# Patient Record
Sex: FEMALE | Race: BLACK OR AFRICAN AMERICAN | NOT HISPANIC OR LATINO | Employment: FULL TIME | URBAN - METROPOLITAN AREA
[De-identification: names, ages, dates, MRNs, and addresses within clinical notes are randomized per-mention and may not be internally consistent; named-entity substitution may affect disease eponyms.]

---

## 2017-11-06 ENCOUNTER — GENERIC CONVERSION - ENCOUNTER (OUTPATIENT)
Dept: OTHER | Facility: OTHER | Age: 47
End: 2017-11-06

## 2017-11-06 DIAGNOSIS — N92.0 EXCESSIVE AND FREQUENT MENSTRUATION WITH REGULAR CYCLE: ICD-10-CM

## 2017-11-08 ENCOUNTER — GENERIC CONVERSION - ENCOUNTER (OUTPATIENT)
Dept: OTHER | Facility: OTHER | Age: 47
End: 2017-11-08

## 2017-11-16 ENCOUNTER — TRANSCRIBE ORDERS (OUTPATIENT)
Dept: LAB | Facility: CLINIC | Age: 47
End: 2017-11-16

## 2017-11-16 ENCOUNTER — APPOINTMENT (OUTPATIENT)
Dept: LAB | Facility: CLINIC | Age: 47
End: 2017-11-16
Payer: COMMERCIAL

## 2017-11-16 DIAGNOSIS — N93.9 VAGINAL HEMORRHAGE: Primary | ICD-10-CM

## 2017-11-16 DIAGNOSIS — N93.9 VAGINAL HEMORRHAGE: ICD-10-CM

## 2017-11-16 LAB
ABO GROUP BLD: NORMAL
BLD GP AB SCN SERPL QL: NEGATIVE
RH BLD: POSITIVE
SPECIMEN EXPIRATION DATE: NORMAL

## 2017-11-16 PROCEDURE — 86900 BLOOD TYPING SEROLOGIC ABO: CPT

## 2017-11-16 PROCEDURE — 86901 BLOOD TYPING SEROLOGIC RH(D): CPT

## 2017-11-16 PROCEDURE — 36415 COLL VENOUS BLD VENIPUNCTURE: CPT

## 2017-11-16 PROCEDURE — 86920 COMPATIBILITY TEST SPIN: CPT

## 2017-11-16 PROCEDURE — 86850 RBC ANTIBODY SCREEN: CPT

## 2017-11-16 RX ORDER — ACETAMINOPHEN 325 MG/1
650 TABLET ORAL ONCE
Status: COMPLETED | OUTPATIENT
Start: 2017-11-18 | End: 2017-11-18

## 2017-11-16 RX ORDER — SODIUM CHLORIDE 9 MG/ML
20 INJECTION, SOLUTION INTRAVENOUS CONTINUOUS
Status: DISCONTINUED | OUTPATIENT
Start: 2017-11-18 | End: 2017-11-21 | Stop reason: HOSPADM

## 2017-11-18 ENCOUNTER — HOSPITAL ENCOUNTER (OUTPATIENT)
Dept: INFUSION CENTER | Facility: CLINIC | Age: 47
Discharge: HOME | End: 2017-11-18
Payer: COMMERCIAL

## 2017-11-18 VITALS
HEART RATE: 72 BPM | SYSTOLIC BLOOD PRESSURE: 130 MMHG | TEMPERATURE: 98 F | RESPIRATION RATE: 18 BRPM | DIASTOLIC BLOOD PRESSURE: 72 MMHG

## 2017-11-18 PROCEDURE — 36430 TRANSFUSION BLD/BLD COMPNT: CPT

## 2017-11-18 PROCEDURE — P9040 RBC LEUKOREDUCED IRRADIATED: HCPCS

## 2017-11-18 RX ORDER — COVID-19 ANTIGEN TEST
1 KIT MISCELLANEOUS AS NEEDED
COMMUNITY
End: 2018-01-11 | Stop reason: HOSPADM

## 2017-11-18 RX ADMIN — ACETAMINOPHEN 650 MG: 325 TABLET ORAL at 09:07

## 2017-11-18 RX ADMIN — SODIUM CHLORIDE 20 ML/HR: 0.9 INJECTION, SOLUTION INTRAVENOUS at 08:55

## 2017-11-18 RX ADMIN — DIPHENHYDRAMINE HYDROCHLORIDE 25 MG: 50 INJECTION, SOLUTION INTRAMUSCULAR; INTRAVENOUS at 09:12

## 2017-11-18 NOTE — PROGRESS NOTES
Pt has no c/o -- Pt states shes been feeling weak, and tired  Her labs jovanna done in [place] where she works  Pt states her Hgb was at 6 6 when last checked       Pt given review of blood transfusion indications, and what s/sx to report

## 2017-11-18 NOTE — PROGRESS NOTES
Pt dcd stable and ambulatory -- has AVS      verb understanding when to call / if need to call MD post transfusion instructions

## 2017-11-19 LAB
ABO GROUP BLD BPU: NORMAL
ABO GROUP BLD BPU: NORMAL
BPU ID: NORMAL
BPU ID: NORMAL
CROSSMATCH: NORMAL
CROSSMATCH: NORMAL
UNIT DISPENSE STATUS: NORMAL
UNIT DISPENSE STATUS: NORMAL
UNIT PRODUCT CODE: NORMAL
UNIT PRODUCT CODE: NORMAL
UNIT RH: NORMAL
UNIT RH: NORMAL

## 2017-11-22 ENCOUNTER — GENERIC CONVERSION - ENCOUNTER (OUTPATIENT)
Dept: OTHER | Facility: OTHER | Age: 47
End: 2017-11-22

## 2017-12-06 ENCOUNTER — GENERIC CONVERSION - ENCOUNTER (OUTPATIENT)
Dept: OTHER | Facility: OTHER | Age: 47
End: 2017-12-06

## 2017-12-20 ENCOUNTER — GENERIC CONVERSION - ENCOUNTER (OUTPATIENT)
Dept: OTHER | Facility: OTHER | Age: 47
End: 2017-12-20

## 2017-12-20 DIAGNOSIS — N93.9 ABNORMAL UTERINE AND VAGINAL BLEEDING, UNSPECIFIED (CODE): ICD-10-CM

## 2017-12-20 PROCEDURE — 88305 TISSUE EXAM BY PATHOLOGIST: CPT | Performed by: OBSTETRICS & GYNECOLOGY

## 2017-12-20 PROCEDURE — 88341 IMHCHEM/IMCYTCHM EA ADD ANTB: CPT | Performed by: OBSTETRICS & GYNECOLOGY

## 2017-12-20 PROCEDURE — 87624 HPV HI-RISK TYP POOLED RSLT: CPT | Performed by: OBSTETRICS & GYNECOLOGY

## 2017-12-20 PROCEDURE — G0143 SCR C/V CYTO,THINLAYER,RESCR: HCPCS | Performed by: OBSTETRICS & GYNECOLOGY

## 2017-12-20 PROCEDURE — 88342 IMHCHEM/IMCYTCHM 1ST ANTB: CPT | Performed by: OBSTETRICS & GYNECOLOGY

## 2017-12-21 ENCOUNTER — LAB REQUISITION (OUTPATIENT)
Dept: LAB | Facility: HOSPITAL | Age: 47
End: 2017-12-21
Payer: COMMERCIAL

## 2017-12-21 DIAGNOSIS — N93.9 ABNORMAL UTERINE AND VAGINAL BLEEDING, UNSPECIFIED (CODE): ICD-10-CM

## 2017-12-21 RX ORDER — FERROUS SULFATE 325(65) MG
1 TABLET ORAL 2 TIMES DAILY
COMMUNITY
Start: 2017-11-13 | End: 2018-03-02 | Stop reason: ALTCHOICE

## 2017-12-21 NOTE — PRE-PROCEDURE INSTRUCTIONS
Pre-Surgery Instructions:   Medication Instructions    ferrous sulfate 325 (65 Fe) mg tablet Instructed patient per Anesthesia Guidelines   Naproxen Sodium (ALEVE) 220 MG CAPS Instructed patient per Anesthesia Guidelines  Pre-op and bathing instructions given

## 2017-12-22 ENCOUNTER — LAB REQUISITION (OUTPATIENT)
Dept: LAB | Facility: HOSPITAL | Age: 47
End: 2017-12-22
Payer: COMMERCIAL

## 2017-12-22 DIAGNOSIS — N93.9 ABNORMAL UTERINE AND VAGINAL BLEEDING, UNSPECIFIED (CODE): ICD-10-CM

## 2017-12-23 ENCOUNTER — APPOINTMENT (OUTPATIENT)
Dept: LAB | Facility: CLINIC | Age: 47
End: 2017-12-23
Payer: COMMERCIAL

## 2017-12-23 ENCOUNTER — TRANSCRIBE ORDERS (OUTPATIENT)
Dept: LAB | Facility: CLINIC | Age: 47
End: 2017-12-23

## 2017-12-23 DIAGNOSIS — N93.9 VAGINAL HEMORRHAGE: Primary | ICD-10-CM

## 2017-12-23 DIAGNOSIS — N93.9 VAGINAL HEMORRHAGE: ICD-10-CM

## 2017-12-23 LAB
BASOPHILS # BLD AUTO: 0.03 THOUSANDS/ΜL (ref 0–0.1)
BASOPHILS NFR BLD AUTO: 1 % (ref 0–1)
EOSINOPHIL # BLD AUTO: 0.15 THOUSAND/ΜL (ref 0–0.61)
EOSINOPHIL NFR BLD AUTO: 2 % (ref 0–6)
EST. AVERAGE GLUCOSE BLD GHB EST-MCNC: 108 MG/DL
HBA1C MFR BLD: 5.4 % (ref 4.2–6.3)
HCT VFR BLD AUTO: 34.8 % (ref 34.8–46.1)
HGB BLD-MCNC: 10.7 G/DL (ref 11.5–15.4)
LYMPHOCYTES # BLD AUTO: 1.77 THOUSANDS/ΜL (ref 0.6–4.47)
LYMPHOCYTES NFR BLD AUTO: 28 % (ref 14–44)
MCH RBC QN AUTO: 23.4 PG (ref 26.8–34.3)
MCHC RBC AUTO-ENTMCNC: 30.7 G/DL (ref 31.4–37.4)
MCV RBC AUTO: 76 FL (ref 82–98)
MONOCYTES # BLD AUTO: 0.64 THOUSAND/ΜL (ref 0.17–1.22)
MONOCYTES NFR BLD AUTO: 10 % (ref 4–12)
NEUTROPHILS # BLD AUTO: 3.74 THOUSANDS/ΜL (ref 1.85–7.62)
NEUTS SEG NFR BLD AUTO: 59 % (ref 43–75)
PLATELET # BLD AUTO: 312 THOUSANDS/UL (ref 149–390)
PMV BLD AUTO: 8.6 FL (ref 8.9–12.7)
RBC # BLD AUTO: 4.57 MILLION/UL (ref 3.81–5.12)
WBC # BLD AUTO: 6.33 THOUSAND/UL (ref 4.31–10.16)

## 2017-12-23 PROCEDURE — 36415 COLL VENOUS BLD VENIPUNCTURE: CPT

## 2017-12-23 PROCEDURE — 83036 HEMOGLOBIN GLYCOSYLATED A1C: CPT

## 2017-12-23 PROCEDURE — 85025 COMPLETE CBC W/AUTO DIFF WBC: CPT

## 2017-12-26 LAB — HPV RRNA GENITAL QL NAA+PROBE: NORMAL

## 2017-12-28 ENCOUNTER — GENERIC CONVERSION - ENCOUNTER (OUTPATIENT)
Dept: OTHER | Facility: OTHER | Age: 47
End: 2017-12-28

## 2018-01-02 LAB
LAB AP GYN PRIMARY INTERPRETATION: NORMAL
Lab: NORMAL

## 2018-01-03 ENCOUNTER — GENERIC CONVERSION - ENCOUNTER (OUTPATIENT)
Dept: OTHER | Facility: OTHER | Age: 48
End: 2018-01-03

## 2018-01-06 ENCOUNTER — ANESTHESIA EVENT (OUTPATIENT)
Dept: PERIOP | Facility: HOSPITAL | Age: 48
DRG: 743 | End: 2018-01-06
Payer: COMMERCIAL

## 2018-01-08 ENCOUNTER — APPOINTMENT (OUTPATIENT)
Dept: RADIOLOGY | Facility: HOSPITAL | Age: 48
DRG: 743 | End: 2018-01-08
Payer: COMMERCIAL

## 2018-01-08 ENCOUNTER — ANESTHESIA (OUTPATIENT)
Dept: PERIOP | Facility: HOSPITAL | Age: 48
DRG: 743 | End: 2018-01-08
Payer: COMMERCIAL

## 2018-01-08 ENCOUNTER — HOSPITAL ENCOUNTER (INPATIENT)
Facility: HOSPITAL | Age: 48
LOS: 3 days | Discharge: HOME | DRG: 743 | End: 2018-01-11
Attending: OBSTETRICS & GYNECOLOGY | Admitting: OBSTETRICS & GYNECOLOGY
Payer: COMMERCIAL

## 2018-01-08 DIAGNOSIS — N93.9 ABNORMAL UTERINE AND VAGINAL BLEEDING, UNSPECIFIED (CODE): ICD-10-CM

## 2018-01-08 PROBLEM — D21.9 FIBROID: Status: ACTIVE | Noted: 2018-01-08

## 2018-01-08 PROBLEM — Z90.710 S/P ABDOMINAL HYSTERECTOMY: Status: ACTIVE | Noted: 2018-01-08

## 2018-01-08 LAB
ABO GROUP BLD: NORMAL
BASOPHILS # BLD AUTO: 0.03 THOUSANDS/ΜL (ref 0–0.1)
BASOPHILS NFR BLD AUTO: 1 % (ref 0–1)
BLD GP AB SCN SERPL QL: NEGATIVE
EOSINOPHIL # BLD AUTO: 0.13 THOUSAND/ΜL (ref 0–0.61)
EOSINOPHIL NFR BLD AUTO: 3 % (ref 0–6)
ERYTHROCYTE [DISTWIDTH] IN BLOOD BY AUTOMATED COUNT: 25.6 % (ref 11.6–15.1)
ERYTHROCYTE [DISTWIDTH] IN BLOOD BY AUTOMATED COUNT: 25.6 % (ref 11.6–15.1)
EXT PREGNANCY TEST URINE: NEGATIVE
HCT VFR BLD AUTO: 30.7 % (ref 34.8–46.1)
HCT VFR BLD AUTO: 34.9 % (ref 34.8–46.1)
HGB BLD-MCNC: 10.7 G/DL (ref 11.5–15.4)
HGB BLD-MCNC: 9.2 G/DL (ref 11.5–15.4)
LYMPHOCYTES # BLD AUTO: 1.4 THOUSANDS/ΜL (ref 0.6–4.47)
LYMPHOCYTES NFR BLD AUTO: 28 % (ref 14–44)
MCH RBC QN AUTO: 24.3 PG (ref 26.8–34.3)
MCH RBC QN AUTO: 24.4 PG (ref 26.8–34.3)
MCHC RBC AUTO-ENTMCNC: 30 G/DL (ref 31.4–37.4)
MCHC RBC AUTO-ENTMCNC: 30.7 G/DL (ref 31.4–37.4)
MCV RBC AUTO: 80 FL (ref 82–98)
MCV RBC AUTO: 81 FL (ref 82–98)
MONOCYTES # BLD AUTO: 0.49 THOUSAND/ΜL (ref 0.17–1.22)
MONOCYTES NFR BLD AUTO: 10 % (ref 4–12)
NEUTROPHILS # BLD AUTO: 2.92 THOUSANDS/ΜL (ref 1.85–7.62)
NEUTS SEG NFR BLD AUTO: 58 % (ref 43–75)
PLATELET # BLD AUTO: 372 THOUSANDS/UL (ref 149–390)
PLATELET # BLD AUTO: 380 THOUSANDS/UL (ref 149–390)
PMV BLD AUTO: 9 FL (ref 8.9–12.7)
PMV BLD AUTO: 9.8 FL (ref 8.9–12.7)
RBC # BLD AUTO: 3.79 MILLION/UL (ref 3.81–5.12)
RBC # BLD AUTO: 4.39 MILLION/UL (ref 3.81–5.12)
RH BLD: POSITIVE
SPECIMEN EXPIRATION DATE: NORMAL
WBC # BLD AUTO: 15.19 THOUSAND/UL (ref 4.31–10.16)
WBC # BLD AUTO: 4.97 THOUSAND/UL (ref 4.31–10.16)

## 2018-01-08 PROCEDURE — 86901 BLOOD TYPING SEROLOGIC RH(D): CPT | Performed by: OBSTETRICS & GYNECOLOGY

## 2018-01-08 PROCEDURE — 0UJD4ZZ INSPECTION OF UTERUS AND CERVIX, PERCUTANEOUS ENDOSCOPIC APPROACH: ICD-10-PCS | Performed by: OBSTETRICS & GYNECOLOGY

## 2018-01-08 PROCEDURE — 85025 COMPLETE CBC W/AUTO DIFF WBC: CPT | Performed by: OBSTETRICS & GYNECOLOGY

## 2018-01-08 PROCEDURE — 88307 TISSUE EXAM BY PATHOLOGIST: CPT | Performed by: OBSTETRICS & GYNECOLOGY

## 2018-01-08 PROCEDURE — 0TJB8ZZ INSPECTION OF BLADDER, VIA NATURAL OR ARTIFICIAL OPENING ENDOSCOPIC: ICD-10-PCS | Performed by: OBSTETRICS & GYNECOLOGY

## 2018-01-08 PROCEDURE — 85027 COMPLETE CBC AUTOMATED: CPT | Performed by: OBSTETRICS & GYNECOLOGY

## 2018-01-08 PROCEDURE — 86923 COMPATIBILITY TEST ELECTRIC: CPT

## 2018-01-08 PROCEDURE — 0UT90ZZ RESECTION OF UTERUS, OPEN APPROACH: ICD-10-PCS | Performed by: OBSTETRICS & GYNECOLOGY

## 2018-01-08 PROCEDURE — 81025 URINE PREGNANCY TEST: CPT | Performed by: OBSTETRICS & GYNECOLOGY

## 2018-01-08 PROCEDURE — 0UT70ZZ RESECTION OF BILATERAL FALLOPIAN TUBES, OPEN APPROACH: ICD-10-PCS | Performed by: OBSTETRICS & GYNECOLOGY

## 2018-01-08 PROCEDURE — 86850 RBC ANTIBODY SCREEN: CPT | Performed by: OBSTETRICS & GYNECOLOGY

## 2018-01-08 PROCEDURE — 82948 REAGENT STRIP/BLOOD GLUCOSE: CPT

## 2018-01-08 PROCEDURE — 94762 N-INVAS EAR/PLS OXIMTRY CONT: CPT

## 2018-01-08 PROCEDURE — 86900 BLOOD TYPING SEROLOGIC ABO: CPT | Performed by: OBSTETRICS & GYNECOLOGY

## 2018-01-08 RX ORDER — PHENAZOPYRIDINE HYDROCHLORIDE 100 MG/1
100 TABLET, FILM COATED ORAL ONCE
Status: COMPLETED | OUTPATIENT
Start: 2018-01-08 | End: 2018-01-08

## 2018-01-08 RX ORDER — LIDOCAINE HYDROCHLORIDE 10 MG/ML
INJECTION, SOLUTION INFILTRATION; PERINEURAL AS NEEDED
Status: DISCONTINUED | OUTPATIENT
Start: 2018-01-08 | End: 2018-01-08 | Stop reason: SURG

## 2018-01-08 RX ORDER — ONDANSETRON 2 MG/ML
4 INJECTION INTRAMUSCULAR; INTRAVENOUS ONCE AS NEEDED
Status: DISCONTINUED | OUTPATIENT
Start: 2018-01-08 | End: 2018-01-08 | Stop reason: HOSPADM

## 2018-01-08 RX ORDER — ONDANSETRON 2 MG/ML
4 INJECTION INTRAMUSCULAR; INTRAVENOUS EVERY 6 HOURS PRN
Status: DISCONTINUED | OUTPATIENT
Start: 2018-01-08 | End: 2018-01-11 | Stop reason: HOSPADM

## 2018-01-08 RX ORDER — MAGNESIUM HYDROXIDE 1200 MG/15ML
LIQUID ORAL AS NEEDED
Status: DISCONTINUED | OUTPATIENT
Start: 2018-01-08 | End: 2018-01-08 | Stop reason: HOSPADM

## 2018-01-08 RX ORDER — SODIUM CHLORIDE 9 MG/ML
INJECTION, SOLUTION INTRAVENOUS CONTINUOUS PRN
Status: DISCONTINUED | OUTPATIENT
Start: 2018-01-08 | End: 2018-01-08 | Stop reason: SURG

## 2018-01-08 RX ORDER — DIPHENHYDRAMINE HYDROCHLORIDE 50 MG/ML
12.5 INJECTION INTRAMUSCULAR; INTRAVENOUS ONCE AS NEEDED
Status: DISCONTINUED | OUTPATIENT
Start: 2018-01-08 | End: 2018-01-08 | Stop reason: HOSPADM

## 2018-01-08 RX ORDER — SODIUM CHLORIDE, SODIUM LACTATE, POTASSIUM CHLORIDE, CALCIUM CHLORIDE 600; 310; 30; 20 MG/100ML; MG/100ML; MG/100ML; MG/100ML
125 INJECTION, SOLUTION INTRAVENOUS CONTINUOUS
Status: DISCONTINUED | OUTPATIENT
Start: 2018-01-08 | End: 2018-01-08

## 2018-01-08 RX ORDER — PREGABALIN 75 MG/1
150 CAPSULE ORAL ONCE
Status: COMPLETED | OUTPATIENT
Start: 2018-01-08 | End: 2018-01-08

## 2018-01-08 RX ORDER — ONDANSETRON 2 MG/ML
INJECTION INTRAMUSCULAR; INTRAVENOUS AS NEEDED
Status: DISCONTINUED | OUTPATIENT
Start: 2018-01-08 | End: 2018-01-08 | Stop reason: SURG

## 2018-01-08 RX ORDER — ALBUMIN, HUMAN INJ 5% 5 %
SOLUTION INTRAVENOUS CONTINUOUS PRN
Status: DISCONTINUED | OUTPATIENT
Start: 2018-01-08 | End: 2018-01-08 | Stop reason: SURG

## 2018-01-08 RX ORDER — DOCUSATE SODIUM 100 MG/1
100 CAPSULE, LIQUID FILLED ORAL 2 TIMES DAILY
Status: DISCONTINUED | OUTPATIENT
Start: 2018-01-08 | End: 2018-01-11 | Stop reason: HOSPADM

## 2018-01-08 RX ORDER — SENNOSIDES 8.6 MG
1 TABLET ORAL DAILY
Status: DISCONTINUED | OUTPATIENT
Start: 2018-01-09 | End: 2018-01-11 | Stop reason: HOSPADM

## 2018-01-08 RX ORDER — GLYCOPYRROLATE 0.2 MG/ML
INJECTION INTRAMUSCULAR; INTRAVENOUS AS NEEDED
Status: DISCONTINUED | OUTPATIENT
Start: 2018-01-08 | End: 2018-01-08 | Stop reason: SURG

## 2018-01-08 RX ORDER — BUPIVACAINE HYDROCHLORIDE AND EPINEPHRINE 2.5; 5 MG/ML; UG/ML
INJECTION, SOLUTION INFILTRATION; PERINEURAL AS NEEDED
Status: DISCONTINUED | OUTPATIENT
Start: 2018-01-08 | End: 2018-01-08 | Stop reason: HOSPADM

## 2018-01-08 RX ORDER — FENTANYL CITRATE 50 UG/ML
INJECTION, SOLUTION INTRAMUSCULAR; INTRAVENOUS AS NEEDED
Status: DISCONTINUED | OUTPATIENT
Start: 2018-01-08 | End: 2018-01-08 | Stop reason: SURG

## 2018-01-08 RX ORDER — ACETAMINOPHEN 325 MG/1
975 TABLET ORAL ONCE
Status: COMPLETED | OUTPATIENT
Start: 2018-01-08 | End: 2018-01-08

## 2018-01-08 RX ORDER — SODIUM CHLORIDE 9 MG/ML
125 INJECTION, SOLUTION INTRAVENOUS CONTINUOUS
Status: DISCONTINUED | OUTPATIENT
Start: 2018-01-08 | End: 2018-01-09

## 2018-01-08 RX ORDER — MIDAZOLAM HYDROCHLORIDE 1 MG/ML
INJECTION INTRAMUSCULAR; INTRAVENOUS AS NEEDED
Status: DISCONTINUED | OUTPATIENT
Start: 2018-01-08 | End: 2018-01-08 | Stop reason: SURG

## 2018-01-08 RX ORDER — KETOROLAC TROMETHAMINE 30 MG/ML
INJECTION, SOLUTION INTRAMUSCULAR; INTRAVENOUS AS NEEDED
Status: DISCONTINUED | OUTPATIENT
Start: 2018-01-08 | End: 2018-01-08 | Stop reason: SURG

## 2018-01-08 RX ORDER — PROPOFOL 10 MG/ML
INJECTION, EMULSION INTRAVENOUS AS NEEDED
Status: DISCONTINUED | OUTPATIENT
Start: 2018-01-08 | End: 2018-01-08 | Stop reason: SURG

## 2018-01-08 RX ORDER — ROPIVACAINE HYDROCHLORIDE 2 MG/ML
INJECTION, SOLUTION EPIDURAL; INFILTRATION; PERINEURAL AS NEEDED
Status: DISCONTINUED | OUTPATIENT
Start: 2018-01-08 | End: 2018-01-08 | Stop reason: SURG

## 2018-01-08 RX ORDER — ROCURONIUM BROMIDE 10 MG/ML
INJECTION, SOLUTION INTRAVENOUS AS NEEDED
Status: DISCONTINUED | OUTPATIENT
Start: 2018-01-08 | End: 2018-01-08 | Stop reason: SURG

## 2018-01-08 RX ORDER — SODIUM CHLORIDE, SODIUM LACTATE, POTASSIUM CHLORIDE, CALCIUM CHLORIDE 600; 310; 30; 20 MG/100ML; MG/100ML; MG/100ML; MG/100ML
125 INJECTION, SOLUTION INTRAVENOUS CONTINUOUS
Status: DISCONTINUED | OUTPATIENT
Start: 2018-01-08 | End: 2018-01-08 | Stop reason: SDUPTHER

## 2018-01-08 RX ORDER — KETOROLAC TROMETHAMINE 30 MG/ML
15 INJECTION, SOLUTION INTRAMUSCULAR; INTRAVENOUS EVERY 6 HOURS SCHEDULED
Status: COMPLETED | OUTPATIENT
Start: 2018-01-08 | End: 2018-01-09

## 2018-01-08 RX ORDER — METOCLOPRAMIDE HYDROCHLORIDE 5 MG/ML
10 INJECTION INTRAMUSCULAR; INTRAVENOUS ONCE AS NEEDED
Status: DISCONTINUED | OUTPATIENT
Start: 2018-01-08 | End: 2018-01-08 | Stop reason: HOSPADM

## 2018-01-08 RX ORDER — ROPIVACAINE HYDROCHLORIDE 5 MG/ML
INJECTION, SOLUTION EPIDURAL; INFILTRATION; PERINEURAL AS NEEDED
Status: DISCONTINUED | OUTPATIENT
Start: 2018-01-08 | End: 2018-01-08 | Stop reason: SURG

## 2018-01-08 RX ORDER — CEFAZOLIN SODIUM 1 G/3ML
INJECTION, POWDER, FOR SOLUTION INTRAMUSCULAR; INTRAVENOUS AS NEEDED
Status: DISCONTINUED | OUTPATIENT
Start: 2018-01-08 | End: 2018-01-08 | Stop reason: SURG

## 2018-01-08 RX ORDER — FENTANYL CITRATE/PF 50 MCG/ML
50 SYRINGE (ML) INJECTION
Status: DISCONTINUED | OUTPATIENT
Start: 2018-01-08 | End: 2018-01-08 | Stop reason: HOSPADM

## 2018-01-08 RX ADMIN — ALBUMIN HUMAN: 0.05 INJECTION, SOLUTION INTRAVENOUS at 11:05

## 2018-01-08 RX ADMIN — KETOROLAC TROMETHAMINE 30 MG: 30 INJECTION, SOLUTION INTRAMUSCULAR at 14:20

## 2018-01-08 RX ADMIN — NEOSTIGMINE METHYLSULFATE 3 MG: 1 INJECTION, SOLUTION INTRAMUSCULAR; INTRAVENOUS; SUBCUTANEOUS at 14:22

## 2018-01-08 RX ADMIN — PHENAZOPYRIDINE HYDROCHLORIDE 100 MG: 100 TABLET ORAL at 08:38

## 2018-01-08 RX ADMIN — ONDANSETRON 4 MG: 2 INJECTION INTRAMUSCULAR; INTRAVENOUS at 13:20

## 2018-01-08 RX ADMIN — Medication: at 16:00

## 2018-01-08 RX ADMIN — ROCURONIUM BROMIDE 10 MG: 10 INJECTION INTRAVENOUS at 13:28

## 2018-01-08 RX ADMIN — CEFAZOLIN SODIUM 2000 MG: 1 INJECTION, POWDER, FOR SOLUTION INTRAMUSCULAR; INTRAVENOUS at 12:29

## 2018-01-08 RX ADMIN — LIDOCAINE HYDROCHLORIDE 50 MG: 10 INJECTION, SOLUTION INFILTRATION; PERINEURAL at 09:02

## 2018-01-08 RX ADMIN — ROCURONIUM BROMIDE 10 MG: 10 INJECTION INTRAVENOUS at 10:45

## 2018-01-08 RX ADMIN — ROCURONIUM BROMIDE 10 MG: 10 INJECTION INTRAVENOUS at 12:27

## 2018-01-08 RX ADMIN — FENTANYL CITRATE 50 MCG: 50 INJECTION INTRAMUSCULAR; INTRAVENOUS at 10:45

## 2018-01-08 RX ADMIN — HYDROMORPHONE HYDROCHLORIDE 0.25 MG: 1 INJECTION, SOLUTION INTRAMUSCULAR; INTRAVENOUS; SUBCUTANEOUS at 14:46

## 2018-01-08 RX ADMIN — ACETAMINOPHEN 975 MG: 325 TABLET, FILM COATED ORAL at 08:39

## 2018-01-08 RX ADMIN — ROCURONIUM BROMIDE 50 MG: 10 INJECTION INTRAVENOUS at 09:02

## 2018-01-08 RX ADMIN — FENTANYL CITRATE 50 MCG: 50 INJECTION INTRAMUSCULAR; INTRAVENOUS at 09:33

## 2018-01-08 RX ADMIN — GLYCOPYRROLATE 0.2 MG: 0.2 INJECTION, SOLUTION INTRAMUSCULAR; INTRAVENOUS at 09:02

## 2018-01-08 RX ADMIN — KETOROLAC TROMETHAMINE 15 MG: 30 INJECTION, SOLUTION INTRAMUSCULAR at 17:48

## 2018-01-08 RX ADMIN — FENTANYL CITRATE 50 MCG: 50 INJECTION INTRAMUSCULAR; INTRAVENOUS at 15:26

## 2018-01-08 RX ADMIN — MIDAZOLAM HYDROCHLORIDE 2 MG: 1 INJECTION, SOLUTION INTRAMUSCULAR; INTRAVENOUS at 08:58

## 2018-01-08 RX ADMIN — HYDROMORPHONE HYDROCHLORIDE 0.5 MG: 1 INJECTION, SOLUTION INTRAMUSCULAR; INTRAVENOUS; SUBCUTANEOUS at 13:10

## 2018-01-08 RX ADMIN — ROCURONIUM BROMIDE 20 MG: 10 INJECTION INTRAVENOUS at 09:43

## 2018-01-08 RX ADMIN — CEFAZOLIN SODIUM 2000 MG: 2 SOLUTION INTRAVENOUS at 09:16

## 2018-01-08 RX ADMIN — DEXAMETHASONE SODIUM PHOSPHATE 10 MG: 10 INJECTION INTRAMUSCULAR; INTRAVENOUS at 09:02

## 2018-01-08 RX ADMIN — SODIUM CHLORIDE: 0.9 INJECTION, SOLUTION INTRAVENOUS at 09:06

## 2018-01-08 RX ADMIN — ROCURONIUM BROMIDE 10 MG: 10 INJECTION INTRAVENOUS at 11:21

## 2018-01-08 RX ADMIN — PROPOFOL 200 MG: 10 INJECTION, EMULSION INTRAVENOUS at 09:02

## 2018-01-08 RX ADMIN — ALBUMIN HUMAN: 0.05 INJECTION, SOLUTION INTRAVENOUS at 12:37

## 2018-01-08 RX ADMIN — ROCURONIUM BROMIDE 10 MG: 10 INJECTION INTRAVENOUS at 11:08

## 2018-01-08 RX ADMIN — PREGABALIN 150 MG: 75 CAPSULE ORAL at 08:38

## 2018-01-08 RX ADMIN — GLYCOPYRROLATE 0.4 MG: 0.2 INJECTION, SOLUTION INTRAMUSCULAR; INTRAVENOUS at 14:22

## 2018-01-08 RX ADMIN — HYDROMORPHONE HYDROCHLORIDE 0.25 MG: 1 INJECTION, SOLUTION INTRAMUSCULAR; INTRAVENOUS; SUBCUTANEOUS at 14:47

## 2018-01-08 RX ADMIN — ROCURONIUM BROMIDE 10 MG: 10 INJECTION INTRAVENOUS at 12:51

## 2018-01-08 RX ADMIN — FENTANYL CITRATE 50 MCG: 50 INJECTION INTRAMUSCULAR; INTRAVENOUS at 09:02

## 2018-01-08 RX ADMIN — FENTANYL CITRATE 50 MCG: 50 INJECTION INTRAMUSCULAR; INTRAVENOUS at 10:07

## 2018-01-08 RX ADMIN — DOCUSATE SODIUM 100 MG: 100 CAPSULE, LIQUID FILLED ORAL at 17:48

## 2018-01-08 RX ADMIN — SODIUM CHLORIDE, SODIUM LACTATE, POTASSIUM CHLORIDE, AND CALCIUM CHLORIDE: .6; .31; .03; .02 INJECTION, SOLUTION INTRAVENOUS at 12:50

## 2018-01-08 RX ADMIN — ROPIVACAINE HYDROCHLORIDE 30 ML: 5 INJECTION, SOLUTION EPIDURAL; INFILTRATION; PERINEURAL at 14:31

## 2018-01-08 RX ADMIN — ROCURONIUM BROMIDE 10 MG: 10 INJECTION INTRAVENOUS at 11:49

## 2018-01-08 RX ADMIN — ROPIVACAINE HYDROCHLORIDE 20 ML: 2 INJECTION, SOLUTION EPIDURAL; INFILTRATION at 14:31

## 2018-01-08 RX ADMIN — HYDROMORPHONE HYDROCHLORIDE 0.5 MG: 1 INJECTION, SOLUTION INTRAMUSCULAR; INTRAVENOUS; SUBCUTANEOUS at 11:07

## 2018-01-08 RX ADMIN — SODIUM CHLORIDE, SODIUM LACTATE, POTASSIUM CHLORIDE, AND CALCIUM CHLORIDE: .6; .31; .03; .02 INJECTION, SOLUTION INTRAVENOUS at 08:30

## 2018-01-08 RX ADMIN — SODIUM CHLORIDE 125 ML/HR: 0.9 INJECTION, SOLUTION INTRAVENOUS at 17:37

## 2018-01-08 NOTE — ANESTHESIA PREPROCEDURE EVALUATION
Review of Systems/Medical History  Patient summary reviewed  Chart reviewed  No history of anesthetic complications     Cardiovascular  Negative cardio ROS Exercise tolerance: good,     Pulmonary  Negative pulmonary ROS ,        GI/Hepatic  Negative GI/hepatic ROS          Negative  ROS        Endo/Other  Negative endo/other ROS      GYN  Not currently pregnant ,     Comment: Metrorrhagia, fibroid uterus, for hysterectomy     Hematology  Negative hematology ROS      Musculoskeletal  Negative musculoskeletal ROS        Neurology  Negative neurology ROS      Psychology   Negative psychology ROS          Physical Exam    Airway  Comment: Large tongue  Mallampati score: III  TM Distance: >3 FB  Neck ROM: full     Dental   No notable dental hx     Cardiovascular  Comment: Negative ROS,     Pulmonary      Other Findings      Lab Results   Component Value Date    WBC 6 33 12/23/2017    HGB 10 7 (L) 12/23/2017     12/23/2017     Blood type B - T&C x 2 units pending    Lab Results   Component Value Date    HGBA1C 5 4 12/23/2017       Anesthesia Plan  ASA Score- 1     Anesthesia Type- general with ASA Monitors  Additional Monitors:   Airway Plan: ETT  Comment: 2 PIVs - preop nurses having difficulty drawing labs so will send off second IV in OR  Preop APAP/lyrica given  Plan Factors-    Induction- intravenous  Postoperative Plan-     Informed Consent- Anesthetic plan and risks discussed with patient, spouse and daughter  I personally reviewed this patient with the CRNA  Discussed and agreed on the Anesthesia Plan with the CRNA  George Hinds

## 2018-01-08 NOTE — ANESTHESIA POSTPROCEDURE EVALUATION
Post-Op Assessment Note      CV Status:  Stable    Mental Status:  Alert    Hydration Status:  Stable    PONV Controlled:  None    Airway Patency:  Patent        Staff: CRNA           BP      Temp (!) (P) 97 °F (36 1 °C) (01/08/18 1445)    Pulse     Resp      SpO2

## 2018-01-08 NOTE — ANESTHESIA PROCEDURE NOTES
Peripheral Block    Patient location during procedure: OR  Start time: 1/8/2018 3:25 PM  Reason for block: at surgeon's request and post-op pain management  Staffing  Anesthesiologist: Rosario Carvajal  Resident/CRNA: Marlane Mortimer  Performed: anesthesiologist   Preanesthetic Checklist  Completed: patient identified, surgical consent, pre-op evaluation, timeout performed, IV checked, risks and benefits discussed and monitors and equipment checked  Peripheral Block  Patient position: supine  Prep: ChloraPrep  Patient monitoring: heart rate, cardiac monitor, continuous pulse ox and frequent blood pressure checks  Block type: TAP  Laterality: bilateral  Injection technique: single-shot  Procedures: ultrasound guided  ultrasound permanent image saved    Local infiltration: ropivacaine (with epi 1:400K)  Infiltration strength: 0 38 %  Dose: 50 mL  Needle  Needle type: Stimuplex   Needle gauge: 22 G  Needle length: 10 cm  Needle localization: ultrasound guidance  Assessment  Injection assessment: incremental injection, local visualized surrounding nerve on ultrasound and negative aspiration for heme  Heart rate change: no  Slow fractionated injection: yes  Post-procedure:  site cleaned  patient tolerated the procedure well with no immediate complications  Additional Notes  Performed under anesthesia at end of case

## 2018-01-08 NOTE — CASE MANAGEMENT
Outpatient auth# [id number], for laparoscopic procedure  Was converted to open procedure, requires reauth for inpatient

## 2018-01-08 NOTE — DISCHARGE SUMMARY
Discharge Summary - Gynecology  Violette Navarro 52 y o  female MRN: [id number]  Unit/Bed#: OR POOL Encounter: [id number]    Admission Date: 2018   Discharge Date: 2018    Attending Physician:   Dr Devi Robledo      Admitting Diagnosis:   Heavy uterine bleeding    Discharge Diagnosis:   Same    Procedures Performed: Total abdominal hysterectomy, bilateral salpingectomy, cystoscopy    HPI:   80-year-old  with history of AUB-H  Patient arrived for her hysterectomy for definitive management of her bleeding  Patient underwent a total abdominal hysterectomy and bilateral salpingectomy and cystoscopy  Hospital Course - Problem Based:  Pain:  Patient was initially managed with a TAPS block as well as Dilaudid PCA  She was then transitioned to PO  medication on postop day number 3  On day of discharge pain was well controlled with oral medication  Anemia secondary to chronic blood loss:  Patient initially had hemoglobin of 10 6  In PACU her hemoglobin was 9 2  On postop day 1 her hemoglobin was 7 1  And repeat was 7 5  FEN:  Patient was advanced to regular diet following the procedure  On day of discharge she was tolerating p o  diet  Lab Results:   Lab Results   Component Value Date    WBC 4 97 2018    HGB 10 7 (L) 2018    HCT 34 9 2018    MCV 80 (L) 2018     2018     No results found for: GLUCOSE, CALCIUM, NA, K, CO2, CL, BUN, CREATININE  No results found for: POCGLU  No results found for: PTT  No results found for: INR, PROTIME    Complications:   none  Condition at Discharge:   good     Discharge Medications:   See after visit summary for reconciled discharge medications provided to patient and family  Discharge instructions: See after visit summary for complete information  Do not place anything (no partner, tampons or douche) in your vagina for 6 weeks    You may walk for exercise for the first 6 weeks then gradually return to your usual activities     You may take stairs one at a time, touching each step with both feet for the first few days, then as tolerated  Please do not drive until tolerating pain and off of narcotics     Please look at your incision in the mirror daily and call for redness or tenderness or increased warmth   Call us for increasing redness or steady drainage from the incision     Please call us for temperature > 100 4*F or 38* C, worsening pain or a foul discharge  No heavy lifting more than one full gallon of milk (about 8 lbs)  No tub baths or swimming  Provisions for Follow-Up Care:   See after visit summary for information related to follow-up care and any pertinent home health orders        Disposition: Home  Planned Readmission: No

## 2018-01-08 NOTE — OP NOTE
OPERATIVE REPORT  PATIENT NAME: Israel Todd    :  1970  MRN: [id number]  Pt Location: AN OR ROOM 02    SURGERY DATE: 2018    Surgeon(s) and Role:     * Marialuisa Springer MD - Primary     * Chely Dickerson MD - Assisting    Preop Diagnosis:  Abnormal uterine and vaginal bleeding, unspecified (CODE) [N93 9]    Post-Op Diagnosis Codes:     * Abnormal uterine and vaginal bleeding, unspecified (CODE) [N93 9]    Procedure: Total Abdominal hysterectomy, bilateral salpingectomy, cystoscopy  Specimen(s):  ID Type Source Tests Collected by Time Destination   1 : uterus, bilateral fallopian tubes, and cervix  Tissue Uterus TISSUE EXAM Marialuisa Springer MD 2018 1037        Estimated Blood Loss:   500 mL    Drains:  Urethral Catheter Latex 16 Fr  (Active)   Site Assessment Clean 2018  2:45 PM   Collection Container Standard drainage bag 2018  3:51 PM   Securement Method Securing device (Describe) 2018  3:51 PM   Output (mL) 150 mL 2018  3:51 PM   Number of days: 0       Anesthesia Type:   General  Tap Block    Operative Indications:  Abnormal uterine and vaginal bleeding, unspecified (CODE) [N93 9]  Fibroid Uterues    Operative Findings:  Large fibroid uterus  Uterus estimated to be >149CU    Complications:   None    Procedure and Technique:  The patient was taken to the operating room where a time out was performed to confirm correct patient and correct procedure  The patient was given preophylactic intravenous antibiotics  General anesthesia was established  The patient was then positioned on the operating table in the dorsal lithotomy position with the legs supported using stirrups  All pressure points were padded and a Shi hugger was placed to maintain control of core body temperature  The patient was then prepped and draped in the usual sterile fashion  A cain catheter was inserted into the bladder and a weighted speculum was placed into the vagina    The BRIGHT retractor was placed into the vagina, and the anterior portion of the cervix was grasped with a single tooth tenaculum  The uterus sounded to 13 cm  A 10 cm yenifer manipulator was placed in through the cervix into the uterus for uterine manipulation  The vaginal occluder was then inflated, and a May catheter was placed into the bladder  Surgeons gloves were then changed, and attention was then turned to the abdomen, where a small  Incision was made supraumbilical   A 10 mm trocar and sleeve were inserted through the incision into the peritoneum  Laparoscopic visualization confirmed the intraperitoneal insertion of the port  Pneumoperitoneum was then established using carbon dioxide  Subsequently, two additional small incisions were made and Two more ports ( 5 top mm, 5mm) were introduced under direct visualization  The patient was placed in trendelenburg, and attention was then turned to the uterine fundus, which was grasped at the level of the fallopian tube  The uterus with fibroids were noted to encompass the entire pelvis, however with manipulation we were able to visualize the adnexa  We were then able to visualize anteriorly and posteriorly in the cul-de-sac  The left ureter was identified to be well out of the surgical field  The enseal dissecting instrument was then used to coagulate and cut the mesosalpynx, making our way to the cornua  The enseal was then used to coagulate and cut the utero-ovarian ligament  The enseal was then used to coagulate and cut round ligament  The broad ligament was then coagulated and cut to the level of the uterine arteries  A bladder flap was created from the left to the right at the level of the lower uterine segment using the enseal   The bladder was pushed down  The enseal was then used to coagulate the uterine artery along the left  The instruments were then switched in the ports and using the enseal, the right fallopian tube was coagulated and cut    We continued using the enseal to coagulate and cut the uteroovarian ligament, to the right utero-ovarian  The broad ligament was then coagulated and cut to the level of the uterine arteries  The enseal was used to coagulate the uterine artery on the right  A bladder flap was then completed using the enseal coming across the uterus to meet the incision from the left side  Making our way down the from ligament on the right side, large fibroid was noted  Extra cauterization was performed on the right side due to increased bleeding  Good hemostasis however was noted after the uterine arteries were cauterized and cut  The cardinal ligaments were then cauterized and cut using the EnSeal   At this point using the monopolar hook, the colpotomy was performed  We started anteriorly and made our way laterally  Hemostasis was noted with the cautery with setting  The left posterior portion of the colpotomy could not be completed due to inability to visualize secondary to large fibroid uterus  At this point this is decision was made to attempt to complete the colpotomy vaginally  The pneumo was turned off  Attention was then turned to the perineum where a weighted speculum was placed  The roomy manipulator was removed from the vagina  A Diaz retractor was used to enter the abdominal cavity anteriorly, and a malleable was used to retract the vaginal walls  Poor descensus was noted  This was likely secondary to the large fibroid uterus which was inhibiting movement of the cervix  Using a Bovie, the vaginal mucosa posteriorly was attempted to be dissected  Because of poor visualization due to no descensus, and aware for need for mini lap for specimen removal, decision was made to proceed with a Pfannenstiel incision to finish the colpotomy and removed specimen  Surgeons gloves were then changed, and attention was turned back to the abdomen   At Pfannenstiel incision was made with a scalpel, and subsequent layers down to the fascia were dissected using a Bovie electrocautery  The fascial incision was made using Bovie electrocautery  Kocher clamps were then used to dissect the rectus muscle off the fascia superiorly and inferiorly  The rectus muscles were divided did at the midline, and the peritoneum was identified tented up with hemostats and entered using Metzenbaum scissors  The incision had to be extended for delivery of the uterus  An Frederic ring retractor(medium size) was used for retraction and visualization of the posterior colpotomy  The bowel was packed away using lap sponges  Using the enseal the posterior colpotomy was completed, and Allis clamps were used to grasp the vaginal cuff  The specimen was then delivered through the abdomen, and handed off to the waiting staff  There is no scale to measure the specimen  The vaginal cuff was closed in figure 8 manner with 0 Vicryl pop-off sutures  The abdomen was then irrigated and good hemostasis was noted  The sutures strings were then cut  The lap sponges were removed from the abdomen, and the Frederic retractor was then removed from the abdomen  Visualization of the rectus muscle confirmed hemostasis  At this point the surgeon changed gloves and gowns for initiation of the closure tray  Using a new closure tray a looped PDS was used to close the fascia  We 1st started from the patient's left side using her Rich to visualize the fascia  Making her way to the middle  The fascial incision was then closed from the right side to the left  In the middle the looped PDS was tied together  Irrigation was then performed, and good hemostasis noted in the subcutaneous tissue  The subcutaneous tissue was closed using 2 0 Vicryl  At this point attention was then turned back to the perineum, where the May catheter was removed and cystoscopy was performed  200 cc of fluid was used for cystoscopy, and bilateral ureter ureteral jets were noted    Patient had received creating prior to the procedure  There is no apparent defects in the bladder walls  Cystoscope was then removed and May catheter was replaced for monitoring of urine output overnight  Vagina was hemostatic  Attention was then turned back to the abdomen where clean gloves and 4 Monocryl were being used to close the Pfannenstiel skin incision  The supraumbilical and bilateral trocar sites were closed using 4 Monocryl  Histacryl was placed on the abdominal incisions  At 3 hours  into the procedure, the Ancef was re-dosed  A tap block was performed by anesthesia prior to extubation  The patient was transferred to the recovery room in stable condition  All needle, sponge, and instrument counts were noted to be correct x 2 at the end of the procedure  I was present and participated in the procedure  An x-ray was performed because the clamps on the vaginal tray had not been counted prior to the procedure   The x-ray showed no foreign objects     I was present for the entire procedure    Patient Disposition:  PACU , hemodynamically stable and extubated and stable    SIGNATURE: Mariposa Buckner MD  DATE: January 8, 2018  TIME: 5:10 PM

## 2018-01-09 LAB
ERYTHROCYTE [DISTWIDTH] IN BLOOD BY AUTOMATED COUNT: 24.9 % (ref 11.6–15.1)
ERYTHROCYTE [DISTWIDTH] IN BLOOD BY AUTOMATED COUNT: 25.1 % (ref 11.6–15.1)
HCT VFR BLD AUTO: 24 % (ref 34.8–46.1)
HCT VFR BLD AUTO: 25 % (ref 34.8–46.1)
HGB BLD-MCNC: 7.1 G/DL (ref 11.5–15.4)
HGB BLD-MCNC: 7.5 G/DL (ref 11.5–15.4)
MCH RBC QN AUTO: 23.8 PG (ref 26.8–34.3)
MCH RBC QN AUTO: 24.3 PG (ref 26.8–34.3)
MCHC RBC AUTO-ENTMCNC: 29.6 G/DL (ref 31.4–37.4)
MCHC RBC AUTO-ENTMCNC: 30 G/DL (ref 31.4–37.4)
MCV RBC AUTO: 81 FL (ref 82–98)
MCV RBC AUTO: 81 FL (ref 82–98)
PLATELET # BLD AUTO: 330 THOUSANDS/UL (ref 149–390)
PLATELET # BLD AUTO: 339 THOUSANDS/UL (ref 149–390)
PMV BLD AUTO: 8.6 FL (ref 8.9–12.7)
PMV BLD AUTO: 8.9 FL (ref 8.9–12.7)
RBC # BLD AUTO: 2.98 MILLION/UL (ref 3.81–5.12)
RBC # BLD AUTO: 3.09 MILLION/UL (ref 3.81–5.12)
WBC # BLD AUTO: 7.98 THOUSAND/UL (ref 4.31–10.16)
WBC # BLD AUTO: 9.16 THOUSAND/UL (ref 4.31–10.16)

## 2018-01-09 PROCEDURE — 94762 N-INVAS EAR/PLS OXIMTRY CONT: CPT

## 2018-01-09 PROCEDURE — 85027 COMPLETE CBC AUTOMATED: CPT | Performed by: OBSTETRICS & GYNECOLOGY

## 2018-01-09 PROCEDURE — C9113 INJ PANTOPRAZOLE SODIUM, VIA: HCPCS | Performed by: OBSTETRICS & GYNECOLOGY

## 2018-01-09 RX ORDER — SODIUM CHLORIDE 9 MG/ML
125 INJECTION, SOLUTION INTRAVENOUS CONTINUOUS
Status: DISCONTINUED | OUTPATIENT
Start: 2018-01-09 | End: 2018-01-10

## 2018-01-09 RX ORDER — PANTOPRAZOLE SODIUM 40 MG/1
40 INJECTION, POWDER, FOR SOLUTION INTRAVENOUS
Status: DISCONTINUED | OUTPATIENT
Start: 2018-01-09 | End: 2018-01-10

## 2018-01-09 RX ORDER — OXYCODONE HYDROCHLORIDE 5 MG/1
5 TABLET ORAL EVERY 4 HOURS PRN
Status: DISCONTINUED | OUTPATIENT
Start: 2018-01-09 | End: 2018-01-11 | Stop reason: HOSPADM

## 2018-01-09 RX ORDER — IBUPROFEN 600 MG/1
600 TABLET ORAL EVERY 6 HOURS SCHEDULED
Status: DISCONTINUED | OUTPATIENT
Start: 2018-01-09 | End: 2018-01-11 | Stop reason: HOSPADM

## 2018-01-09 RX ORDER — ACETAMINOPHEN 325 MG/1
650 TABLET ORAL EVERY 6 HOURS SCHEDULED
Status: DISCONTINUED | OUTPATIENT
Start: 2018-01-09 | End: 2018-01-11 | Stop reason: HOSPADM

## 2018-01-09 RX ADMIN — Medication 524 MG: at 21:03

## 2018-01-09 RX ADMIN — OXYCODONE HYDROCHLORIDE 5 MG: 5 TABLET ORAL at 19:27

## 2018-01-09 RX ADMIN — DOCUSATE SODIUM 100 MG: 100 CAPSULE, LIQUID FILLED ORAL at 09:05

## 2018-01-09 RX ADMIN — KETOROLAC TROMETHAMINE 15 MG: 30 INJECTION, SOLUTION INTRAMUSCULAR at 11:44

## 2018-01-09 RX ADMIN — ACETAMINOPHEN 650 MG: 325 TABLET ORAL at 17:22

## 2018-01-09 RX ADMIN — SENNOSIDES 8.6 MG: 8.6 TABLET, FILM COATED ORAL at 09:05

## 2018-01-09 RX ADMIN — SODIUM CHLORIDE 125 ML/HR: 0.9 INJECTION, SOLUTION INTRAVENOUS at 09:05

## 2018-01-09 RX ADMIN — SODIUM CHLORIDE 125 ML/HR: 0.9 INJECTION, SOLUTION INTRAVENOUS at 17:18

## 2018-01-09 RX ADMIN — KETOROLAC TROMETHAMINE 15 MG: 30 INJECTION, SOLUTION INTRAMUSCULAR at 00:23

## 2018-01-09 RX ADMIN — DOCUSATE SODIUM 100 MG: 100 CAPSULE, LIQUID FILLED ORAL at 17:22

## 2018-01-09 RX ADMIN — IBUPROFEN 600 MG: 600 TABLET ORAL at 17:26

## 2018-01-09 RX ADMIN — KETOROLAC TROMETHAMINE 15 MG: 30 INJECTION, SOLUTION INTRAMUSCULAR at 05:54

## 2018-01-09 RX ADMIN — ACETAMINOPHEN 650 MG: 325 TABLET ORAL at 11:44

## 2018-01-09 RX ADMIN — PANTOPRAZOLE SODIUM 40 MG: 40 INJECTION, POWDER, FOR SOLUTION INTRAVENOUS at 11:27

## 2018-01-09 NOTE — PROGRESS NOTES
Gyn Oncology Progress note   Shirley Garcia 52 y o  female MRN: [id number]  Unit/Bed#: -01 Encounter: [id number]    Shirley Garcia has no current complaints  Pain is present - adequately treated  Patient is currently voiding  She has gotten out of bed once  Patient is currently passing flatus and has had no bowel movement  She is tolerating PO, and denies nausea or vomitting  Patient denies fever, chills, chest pain, shortness of breath, or calf tenderness  /58   Pulse 70   Temp 98 2 °F (36 8 °C) (Oral)   Resp 18   Ht 5' 6" (1 676 m)   Wt 81 6 kg (180 lb)   SpO2 97%   BMI 29 05 kg/m²     I/O this shift:  In: -   Out: 175 [Urine:175]    Lab Results   Component Value Date    WBC 9 16 01/09/2018    HGB 7 1 (L) 01/09/2018    HCT 24 0 (L) 01/09/2018    MCV 81 (L) 01/09/2018     01/09/2018       No results found for: GLUCOSE, CALCIUM, NA, K, CO2, CL, BUN, CREATININE    Physical Exam  Gen: AAOx3, NAD, comfortable in bed  CVS: S1S2+, RRR, no murmurs  Lungs: CTA b/l normal respiratory effort and rate  Abdomen: soft, non tender, incisions C/D/I, min bowel sounds  Extremities: SCDs on and on, non tender    A/P: POD #1 s/p RALPH, BS, cysto for fibroid uterus doing well  1) Acute blood loss anemia: HGB 7 1 this morning  Preop hgb 10 7  Normal platelets  Will repeat hgb at noon  Discussed possibility of transfusion with patient if she becomes symptomatic  Patient has 2uprbc on hold  2) Pain: Dilaudid pca pump dc  Has toradol ATC, and scheduled tylenol ordered  Oxycodone as needed  3) Fibroid: s/p above mentioned surgery  Continue routine postop care     4) DVT PPx: SCDs, protonix  5) Encouraged incentive spirometry to reduce atelectasis and pneumonia risk  6) Encouraged ambulation as tolerated

## 2018-01-09 NOTE — CASE MANAGEMENT
Initial Clinical Review  CHANGED TO OPEN PROCEDURE , was approved as outpatient procedure  Please review for change to inpatient  Op report at end of review  Age/Sex: 52 y o  female    Surgery Date: 1/8/2018    Procedure: Total Abdominal hysterectomy, bilateral salpingectomy, cystoscopy  Anesthesia: general tap block    Admission Orders: Date/Time/Statement: 1/8/18 @ 440 2168     Orders Placed This Encounter   Procedures    Inpatient Admission     Standing Status:   Standing     Number of Occurrences:   1     Order Specific Question:   Admitting Physician     Answer:   Kris Francis     Order Specific Question:   Level of Care     Answer:   Med Surg [16]     Order Specific Question:   Estimated length of stay     Answer:   Inpatient Only Surgery       Vital Signs: /58   Pulse 70   Temp 98 2 °F (36 8 °C) (Oral)   Resp 18   Ht 5' 6" (1 676 m)   Wt 81 6 kg (180 lb)   SpO2 97%   BMI 29 05 kg/m²     Diet:        Diet Orders            Start     Ordered    01/08/18 1636  Diet Regular; Regular House; Cons Carb 3: 22-2400Kcals  Diet effective now     Question Answer Comment   Diet Type Regular    Regular Regular House    Other Restriction(s): Cons Carb 3: 22-2400Kcals    RD to adjust diet per protocol? Yes        01/08/18 1635    01/08/18 1624  Room Service  Once     Question:  Type of Service  Answer:  Room Service-Appropriate    01/08/18 1624          Mobility: up with assistance  DVT Prophylaxis: scds    Pain Control: toradol 15 mg iv q 6h    dilaudid 1 mg/ml 50 ml PCA dose 0 2 mg iv  Pain Medications             Naproxen Sodium (ALEVE) 220 MG CAPS Take 1 capsule by mouth as needed        OTHER ORDERS: ivf @ 125 cc/h  Po medication:  Colace  Lyrica  Senna     Continuous pulse oximetry      Brett Fiore MD Physician Signed OB/GYN  Op Note Date of Service: 1/8/2018  9:33 AM   Case ID: [id number] Case Time: 1/8/2018  9:33 AM Surgeon: Brett Fiore MD   Procedure: TOTAL LAPAROSCOPIC HYSTERECTOMY; BILATERAL SALPINGECTOMY Location: [facility]          []Hide copied text  []Hover for attribution information  OPERATIVE REPORT  PATIENT NAME: Lew Carreno    :  1970  MRN: [id number]  Pt Location: [facility] RO[facility] 02     SURGERY DATE: 2018     Surgeon(s) and Role:     * Parrish King MD - Primary     * Nohemi Marrero MD - Assisting     Preop Diagnosis:  Abnormal uterine and vaginal bleeding, unspecified (CODE) [N93 9]     Post-Op Diagnosis Codes:     * Abnormal uterine and vaginal bleeding, unspecified (CODE) [N93 9]     Procedure: Total Abdominal hysterectomy, bilateral salpingectomy, cystoscopy       Specimen(s):  ID Type Source Tests Collected by Time Destination   1 : uterus, bilateral fallopian tubes, and cervix  Tissue Uterus TISSUE EXAM Parrish King MD 2018 1037           Estimated Blood Loss:   500 mL     Drains:       Urethral Catheter Latex 16 Fr  (Active)   Site Assessment Clean 2018  2:45 PM   Collection Container Standard drainage bag 2018  3:51 PM   Securement Method Securing device (Describe) 2018  3:51 PM   Output (mL) 150 mL 2018  3:51 PM   Number of days: 0         Anesthesia Type:   General  Tap Block     Operative Indications:  Abnormal uterine and vaginal bleeding, unspecified (CODE) [N93 9]  Fibroid Uterues     Operative Findings:  Large fibroid uterus  Uterus estimated to be >525NJ     Complications:   None     Procedure and Technique:  The patient was taken to the operating room where a time out was performed to confirm correct patient and correct procedure  The patient was given preophylactic intravenous antibiotics  General anesthesia was established  The patient was then positioned on the operating table in the dorsal lithotomy position with the legs supported using stirrups  All pressure points were padded and a Shi hugger was placed to maintain control of core body temperature    The patient was then prepped and draped in the usual sterile fashion       A cain catheter was inserted into the bladder and a weighted speculum was placed into the vagina  The BRIGHT retractor was placed into the vagina, and the anterior portion of the cervix was grasped with a single tooth tenaculum  The uterus sounded to 13 cm  A 10 cm yenifer manipulator was placed in through the cervix into the uterus for uterine manipulation  The vaginal occluder was then inflated, and a Cain catheter was placed into the bladder  Surgeons gloves were then changed, and attention was then turned to the abdomen, where a small  Incision was made supraumbilical   A 10 mm trocar and sleeve were inserted through the incision into the peritoneum  Laparoscopic visualization confirmed the intraperitoneal insertion of the port  Pneumoperitoneum was then established using carbon dioxide        Subsequently, two additional small incisions were made and Two more ports ( 5 top mm, 5mm) were introduced under direct visualization        The patient was placed in trendelenburg, and attention was then turned to the uterine fundus, which was grasped at the level of the fallopian tube  The uterus with fibroids were noted to encompass the entire pelvis, however with manipulation we were able to visualize the adnexa  We were then able to visualize anteriorly and posteriorly in the cul-de-sac  The left ureter was identified to be well out of the surgical field  The enseal dissecting instrument was then used to coagulate and cut the mesosalpynx, making our way to the cornua  The enseal was then used to coagulate and cut the utero-ovarian ligament  The enseal was then used to coagulate and cut round ligament  The broad ligament was then coagulated and cut to the level of the uterine arteries  A bladder flap was created from the left to the right at the level of the lower uterine segment using the enseal   The bladder was pushed down   The enseal was then used to coagulate the uterine artery along the left       The instruments were then switched in the ports and using the enseal, the right fallopian tube was coagulated and cut  We continued using the enseal to coagulate and cut the uteroovarian ligament, to the right utero-ovarian  The broad ligament was then coagulated and cut to the level of the uterine arteries  The enseal was used to coagulate the uterine artery on the right  A bladder flap was then completed using the enseal coming across the uterus to meet the incision from the left side  Making our way down the from ligament on the right side, large fibroid was noted  Extra cauterization was performed on the right side due to increased bleeding  Good hemostasis however was noted after the uterine arteries were cauterized and cut  The cardinal ligaments were then cauterized and cut using the EnSeal   At this point using the monopolar hook, the colpotomy was performed  We started anteriorly and made our way laterally  Hemostasis was noted with the cautery with setting  The left posterior portion of the colpotomy could not be completed due to inability to visualize secondary to large fibroid uterus  At this point this is decision was made to attempt to complete the colpotomy vaginally  The pneumo was turned off      Attention was then turned to the perineum where a weighted speculum was placed  The roomy manipulator was removed from the vagina  A Diaz retractor was used to enter the abdominal cavity anteriorly, and a malleable was used to retract the vaginal walls  Poor descensus was noted  This was likely secondary to the large fibroid uterus which was inhibiting movement of the cervix  Using a Bovie, the vaginal mucosa posteriorly was attempted to be dissected    Because of poor visualization due to no descensus, and aware for need for mini lap for specimen removal, decision was made to proceed with a Pfannenstiel incision to finish the colpotomy and removed specimen        Surgeons gloves were then changed, and attention was turned back to the abdomen  At Pfannenstiel incision was made with a scalpel, and subsequent layers down to the fascia were dissected using a Bovie electrocautery  The fascial incision was made using Bovie electrocautery  Kocher clamps were then used to dissect the rectus muscle off the fascia superiorly and inferiorly  The rectus muscles were divided did at the midline, and the peritoneum was identified tented up with hemostats and entered using Metzenbaum scissors  The incision had to be extended for delivery of the uterus  An Frederic ring retractor(medium size) was used for retraction and visualization of the posterior colpotomy  The bowel was packed away using lap sponges  Using the enseal the posterior colpotomy was completed, and Allis clamps were used to grasp the vaginal cuff  The specimen was then delivered through the abdomen, and handed off to the waiting staff  There is no scale to measure the specimen  The vaginal cuff was closed in figure 8 manner with 0 Vicryl pop-off sutures  The abdomen was then irrigated and good hemostasis was noted  The sutures strings were then cut  The lap sponges were removed from the abdomen, and the Frederic retractor was then removed from the abdomen  Visualization of the rectus muscle confirmed hemostasis  At this point the surgeon changed gloves and gowns for initiation of the closure tray  Using a new closure tray a looped PDS was used to close the fascia  We 1st started from the patient's left side using her Rich to visualize the fascia  Making her way to the middle  The fascial incision was then closed from the right side to the left  In the middle the looped PDS was tied together  Irrigation was then performed, and good hemostasis noted in the subcutaneous tissue    The subcutaneous tissue was closed using 2 0 Vicryl       At this point attention was then turned back to the perineum, where the May catheter was removed and cystoscopy was performed  200 cc of fluid was used for cystoscopy, and bilateral ureter ureteral jets were noted  Patient had received creating prior to the procedure  There is no apparent defects in the bladder walls  Cystoscope was then removed and May catheter was replaced for monitoring of urine output overnight  Vagina was hemostatic       Attention was then turned back to the abdomen where clean gloves and 4 Monocryl were being used to close the Pfannenstiel skin incision  The supraumbilical and bilateral trocar sites were closed using 4 Monocryl  Histacryl was placed on the abdominal incisions  At 3 hours  into the procedure, the Ancef was re-dosed  A tap block was performed by anesthesia prior to extubation       The patient was transferred to the recovery room in stable condition  All needle, sponge, and instrument counts were noted to be correct x 2 at the end of the procedure  I was present and participated in the procedure        An x-ray was performed because the clamps on the vaginal tray had not been counted prior to the procedure  The x-ray showed no foreign objects      I was present for the entire procedure     Patient Disposition:  PACU , hemodynamically stable and extubated and stable     SIGNATURE: Sarah Hortno MD  DATE: January 8, 2018  TIME: 5:10 PM             Thank you,  [facility] in the [facility] by Serafin Cronin for 2017  Network Utilization Review Department  Phone: [phone]; Fax [phone]  ATTENTION: The Network Utilization Review Department is now centralized for our 7 Facilities  Make a note that we have a new phone and fax numbers for our Department  Please call with any questions or concerns to [phone] and carefully follow the prompts so that you are directed to the right person   All voicemails are confidential  Fax any determinations, approvals, denials, and requests for initial or continue stay review clinical to [phone]  Due to HIGH CALL volume, it would be easier if you could please send faxed requests to expedite your requests and in part, help us provide discharge notifications faster

## 2018-01-10 ENCOUNTER — APPOINTMENT (INPATIENT)
Dept: ULTRASOUND IMAGING | Facility: HOSPITAL | Age: 48
DRG: 743 | End: 2018-01-10
Payer: COMMERCIAL

## 2018-01-10 PROCEDURE — 93971 EXTREMITY STUDY: CPT

## 2018-01-10 PROCEDURE — C9113 INJ PANTOPRAZOLE SODIUM, VIA: HCPCS | Performed by: OBSTETRICS & GYNECOLOGY

## 2018-01-10 RX ORDER — PANTOPRAZOLE SODIUM 40 MG/1
40 TABLET, DELAYED RELEASE ORAL
Status: DISCONTINUED | OUTPATIENT
Start: 2018-01-11 | End: 2018-01-11 | Stop reason: HOSPADM

## 2018-01-10 RX ADMIN — DOCUSATE SODIUM 100 MG: 100 CAPSULE, LIQUID FILLED ORAL at 09:49

## 2018-01-10 RX ADMIN — ACETAMINOPHEN 650 MG: 325 TABLET ORAL at 06:10

## 2018-01-10 RX ADMIN — SODIUM CHLORIDE 125 ML/HR: 0.9 INJECTION, SOLUTION INTRAVENOUS at 01:13

## 2018-01-10 RX ADMIN — SODIUM CHLORIDE 125 ML/HR: 0.9 INJECTION, SOLUTION INTRAVENOUS at 09:24

## 2018-01-10 RX ADMIN — SENNOSIDES 8.6 MG: 8.6 TABLET, FILM COATED ORAL at 09:49

## 2018-01-10 RX ADMIN — ACETAMINOPHEN 650 MG: 325 TABLET ORAL at 18:18

## 2018-01-10 RX ADMIN — IBUPROFEN 600 MG: 600 TABLET ORAL at 18:18

## 2018-01-10 RX ADMIN — IBUPROFEN 600 MG: 600 TABLET ORAL at 00:35

## 2018-01-10 RX ADMIN — ACETAMINOPHEN 650 MG: 325 TABLET ORAL at 12:24

## 2018-01-10 RX ADMIN — IBUPROFEN 600 MG: 600 TABLET ORAL at 06:10

## 2018-01-10 RX ADMIN — DOCUSATE SODIUM 100 MG: 100 CAPSULE, LIQUID FILLED ORAL at 18:19

## 2018-01-10 RX ADMIN — ACETAMINOPHEN 650 MG: 325 TABLET ORAL at 00:35

## 2018-01-10 RX ADMIN — OXYCODONE HYDROCHLORIDE 5 MG: 5 TABLET ORAL at 09:49

## 2018-01-10 RX ADMIN — OXYCODONE HYDROCHLORIDE 5 MG: 5 TABLET ORAL at 18:52

## 2018-01-10 RX ADMIN — PANTOPRAZOLE SODIUM 40 MG: 40 INJECTION, POWDER, FOR SOLUTION INTRAVENOUS at 09:49

## 2018-01-10 RX ADMIN — IBUPROFEN 600 MG: 600 TABLET ORAL at 12:24

## 2018-01-10 NOTE — CASE MANAGEMENT
Continued Stay Review    Date: 1/10/2018     Vital Signs: /61   Pulse 79   Temp 98 4 °F (36 9 °C) (Oral)   Resp 18   Ht 5' 6" (1 676 m)   Wt 81 6 kg (180 lb)   SpO2 98%   BMI 29 05 kg/m²     Medications:   Scheduled Meds:   acetaminophen 650 mg Oral Q6H [address]   docusate sodium 100 mg Oral BID   ibuprofen 600 mg Oral Q6H [address]   [START ON 1/11/2018] pantoprazole 40 mg Oral Early Morning   senna 1 tablet Oral Daily     Continuous Infusions:    PRN Meds: bismuth subsalicylate    ondansetron    oxyCODONE - used x 1  Abnormal Labs/Diagnostic Results: none  Age/Sex: 52 y o  female     Assessment/Plan: POD #2 sp RALPH, BS, Cysto  1) Pain: continue scheduled motrin with tylenol and prn oxycodone  rx given to patient for oxycodone  2) Calf pain: Negative homans sign  Tender to palpation  Will order RLE doppler  Low suspicion for DVT  3) FEN: d/c IV fluids  4) PPx: SCDs, Protonix  5) Encouraged incentive spirometry to reduce atelectasis and pneumonia risk  6) Encouraged ambulation as tolerated  7) Dispo: anticipate d/c home tomorrow    Discharge Plan: home  Thank you,  [address] [facility] in the [facility] by Serafin Cronin for 2017  Network Utilization Review Department  Phone: [phone]; Fax [phone]  ATTENTION: The Network Utilization Review Department is now centralized for our 7 Facilities  Make a note that we have a new phone and fax numbers for our Department  Please call with any questions or concerns to [phone] and carefully follow the prompts so that you are directed to the right person  All voicemails are confidential  Fax any determinations, approvals, denials, and requests for initial or continue stay review clinical to [phone]  Due to HIGH CALL volume, it would be easier if you could please send faxed requests to expedite your requests and in part, help us provide discharge notifications faster

## 2018-01-10 NOTE — PROGRESS NOTES
Gyn Oncology Progress note   Berna Rodriguez 52 y o  female MRN: [id number]  Unit/Bed#: -01 Encounter: [id number]    Berna Rodriguez has complaints of Right calf pain when walking  Pain is present - adequately treated  Patient is currently voiding  She is ambulating  Patient is currently passing flatus and has had no bowel movement  She is tolerating PO, and denies nausea or vomitting  Patient denies fever, chills, chest pain, shortness of breath, or calf tenderness  /61   Pulse 79   Temp 98 4 °F (36 9 °C) (Oral)   Resp 18   Ht 5' 6" (1 676 m)   Wt 81 6 kg (180 lb)   SpO2 98%   BMI 29 05 kg/m²     I/O this shift:  In: -   Out: 400 [Urine:400]    Lab Results   Component Value Date    WBC 7 98 01/09/2018    HGB 7 5 (L) 01/09/2018    HCT 25 0 (L) 01/09/2018    MCV 81 (L) 01/09/2018     01/09/2018       No results found for: GLUCOSE, CALCIUM, NA, K, CO2, CL, BUN, CREATININE    Physical Exam  Gen: AAOx3, NAD, comfortable in bed  CVS: S1S2+, RRR, no murmurs  Lungs: CTA b/l normal respiratory effort and rate  Abdomen: soft, non tender, incisions C/D/I, + bowel sounds  Extremities: SCDs on and on, non tender    A/P: POD #2 sp RALPH, BS, Cysto  1) Pain: continue scheduled motrin with tylenol and prn oxycodone  rx given to patient for oxycodone  2) Calf pain: Negative homans sign  Tender to palpation  Will order RLE doppler    Low suspicion for DVT  3) FEN: d/c IV fluids  4) PPx: SCDs, Protonix  5) Encouraged incentive spirometry to reduce atelectasis and pneumonia risk  6) Encouraged ambulation as tolerated  7) Dispo: anticipate d/c home tomorrow

## 2018-01-10 NOTE — PROGRESS NOTES
The pantoprazole has been converted to Oral per [facility] IV-to-PO Auto-Conversion Protocol for Adults as approved by the Pharmacy and Therapeutics Committee  The patient met all eligible criteria:  3 Age = 25years old   2) Received at least one dose of the IV form   3) Receiving at least one other scheduled oral/enteral medication   4) Tolerating an oral/enteral diet   and did not have any exclusions:   1) Critical care patient   2) Active GI bleed (IF assessing H2RAs or PPIs)   3) Continuous tube feeding (IF assessing cipro, doxycycline, levofloxacin, minocycline, rifampin, or voriconazole)   4) Receiving PO vancomycin (IF assessing metronidazole)   5) Persistent nausea and/or vomiting   6) Ileus or gastrointestinal obstruction   7) Ileana/nasogastric tube set for continuous suction   8) Specific order not to automatically convert to PO (in the order's comments or if discussed in the most recent Infectious Disease or primary team's progress notes)

## 2018-01-11 VITALS
HEIGHT: 66 IN | OXYGEN SATURATION: 100 % | RESPIRATION RATE: 16 BRPM | HEART RATE: 75 BPM | DIASTOLIC BLOOD PRESSURE: 59 MMHG | BODY MASS INDEX: 28.93 KG/M2 | SYSTOLIC BLOOD PRESSURE: 116 MMHG | WEIGHT: 180 LBS | TEMPERATURE: 99 F

## 2018-01-11 RX ORDER — ACETAMINOPHEN 325 MG/1
650 TABLET ORAL EVERY 6 HOURS PRN
Qty: 30 TABLET | Refills: 0
Start: 2018-01-11 | End: 2018-03-02 | Stop reason: ALTCHOICE

## 2018-01-11 RX ORDER — OXYCODONE HYDROCHLORIDE 5 MG/1
5 TABLET ORAL EVERY 4 HOURS PRN
Qty: 30 TABLET | Refills: 0
Start: 2018-01-11 | End: 2018-01-21

## 2018-01-11 RX ORDER — IBUPROFEN 600 MG/1
600 TABLET ORAL EVERY 6 HOURS SCHEDULED
Qty: 30 TABLET | Refills: 0
Start: 2018-01-11 | End: 2018-02-09 | Stop reason: ALTCHOICE

## 2018-01-11 RX ORDER — DOCUSATE SODIUM 100 MG/1
100 CAPSULE, LIQUID FILLED ORAL 2 TIMES DAILY
Qty: 10 CAPSULE | Refills: 0
Start: 2018-01-11 | End: 2018-02-09 | Stop reason: ALTCHOICE

## 2018-01-11 RX ORDER — SENNOSIDES 8.6 MG
1 TABLET ORAL DAILY
Qty: 120 EACH | Refills: 0
Start: 2018-01-12 | End: 2018-02-09 | Stop reason: ALTCHOICE

## 2018-01-11 RX ADMIN — ACETAMINOPHEN 650 MG: 325 TABLET ORAL at 00:51

## 2018-01-11 RX ADMIN — PANTOPRAZOLE SODIUM 40 MG: 40 TABLET, DELAYED RELEASE ORAL at 06:13

## 2018-01-11 RX ADMIN — DOCUSATE SODIUM 100 MG: 100 CAPSULE, LIQUID FILLED ORAL at 08:57

## 2018-01-11 RX ADMIN — ACETAMINOPHEN 650 MG: 325 TABLET ORAL at 11:47

## 2018-01-11 RX ADMIN — OXYCODONE HYDROCHLORIDE 5 MG: 5 TABLET ORAL at 00:51

## 2018-01-11 RX ADMIN — IBUPROFEN 600 MG: 600 TABLET ORAL at 00:51

## 2018-01-11 RX ADMIN — ACETAMINOPHEN 650 MG: 325 TABLET ORAL at 06:13

## 2018-01-11 RX ADMIN — IBUPROFEN 600 MG: 600 TABLET ORAL at 06:13

## 2018-01-11 RX ADMIN — SENNOSIDES 8.6 MG: 8.6 TABLET, FILM COATED ORAL at 08:57

## 2018-01-11 RX ADMIN — IBUPROFEN 600 MG: 600 TABLET ORAL at 11:47

## 2018-01-11 NOTE — PROGRESS NOTES
Gyn Oncology Progress note   Andres Kirkpatrick 52 y o  female MRN: [id number]  Unit/Bed#: -01 Encounter: [id number]    Andres Kirkpatrick has no current complaints  Pain is present - adequately treated  Patient is currently voiding  She is ambulating  Patient is currently passing flatus and has had bowel movement  She is tolerating PO, and denies nausea or vomitting  Patient denies fever, chills, chest pain, shortness of breath, or calf tenderness  /59   Pulse 75   Temp 99 °F (37 2 °C) (Oral)   Resp 16   Ht 5' 6" (1 676 m)   Wt 81 6 kg (180 lb)   SpO2 100%   BMI 29 05 kg/m²     I/O this shift:  In: -   Out: 550 [Urine:550]    Lab Results   Component Value Date    WBC 7 98 01/09/2018    HGB 7 5 (L) 01/09/2018    HCT 25 0 (L) 01/09/2018    MCV 81 (L) 01/09/2018     01/09/2018       No results found for: GLUCOSE, CALCIUM, NA, K, CO2, CL, BUN, CREATININE    Physical Exam  Gen: AAOx3, NAD, comfortable in bed  CVS: S1S2+, RRR, no murmurs  Lungs: CTA b/l normal respiratory effort and rate  Abdomen: soft, non tender, incisions C/D/I  Extremities: SCDs on and on, non tender    A/P:POD 3 s/p RALPH, BS, cysto for fibroid uterues  1)Fibroid uterus- s/p above mentioned surgery  Doing welll  2) routine postop care  3) calf pain- resolved    Dopplers negative  4) DVT PPx: SCDs  5) Encouraged incentive spirometry to reduce atelectasis and pneumonia risk  6) Encouraged ambulation as tolerated  7) Dispo: d/c home today

## 2018-01-11 NOTE — DISCHARGE INSTRUCTIONS
Abdominal Hysterectomy   WHAT YOU SHOULD KNOW:   An abdominal hysterectomy (AH) is surgery to remove your uterus  Your uterus will be removed through an incision in your abdomen  You may need an AH if you have a tumor in your uterus or other reproductive organs  You may also need an AH if you have an infection, pain, or bleeding  AFTER YOU LEAVE:   Medicines:   · Pain medicine: You may be given medicine to take away or decrease pain  Do not wait until the pain is severe before you take your medicine  · Antinausea medicine: This medicine may be given to calm your stomach and prevent vomiting  · Blood thinners  help prevent blood clots  Blood thinners may be given before, during, and after a surgery or procedure  Blood thinners make it more likely for you to bleed or bruise  · Take your medicine as directed  Call your healthcare provider if you think your medicine is not helping or if you have side effects  Tell him if you are allergic to any medicine  Keep a list of the medicines, vitamins, and herbs you take  Include the amounts, and when and why you take them  Bring the list or the pill bottles to follow-up visits  Carry your medicine list with you in case of an emergency  Follow up with your primary healthcare provider or gynecologist as directed:  Ask how to care for your wound  You may need blood tests, x-rays, or ultrasounds at your follow-up visits  Write down your questions so you remember to ask them during your visits  Limit activity until you have fully recovered from surgery:   · Ask when it is safe for you to drive, walk up stairs, lift heavy objects, and have sex  · Ask when it is okay to exercise, and what types of exercise to do  Start slowly and do more as you get stronger  Contact your primary healthcare provider or gynecologist if:   · You have heavy vaginal bleeding that fills 1 or more sanitary pads in 1 hour  · Your wound opens      · You have a fever, and your wound is red and swollen  · You have yellow, green, or bad-smelling discharge coming from your vagina  · You feel new pain or fullness in your vagina  · You have questions or concerns about your surgery, medicine, or care  Seek care immediately or call 911 if:   · You have new or more blood from your vagina or your wound  · Your arm or leg feels warm, tender, and painful  It may look swollen and red  · You suddenly feel lightheaded and have trouble breathing  · You have chest pain  You may have more pain when you take a deep breath or cough  You may cough up blood  © 2014 [address] is for End User's use only and may not be sold, redistributed or otherwise used for commercial purposes  All illustrations and images included in CareNotes® are the copyrighted property of A D [employer] M [employer]  or Serafin Cronin  The above information is an  only  It is not intended as medical advice for individual conditions or treatments  Talk to your doctor, nurse or pharmacist before following any medical regimen to see if it is safe and effective for you  Abdominal Hysterectomy   WHAT YOU SHOULD KNOW:   An abdominal hysterectomy (AH) is surgery to remove your uterus  Your uterus will be removed through an incision in your abdomen  You may need an AH if you have a tumor in your uterus or other reproductive organs  You may also need an AH if you have an infection, pain, or bleeding  AFTER YOU LEAVE:   Medicines:   · Pain medicine: You may be given medicine to take away or decrease pain  Do not wait until the pain is severe before you take your medicine  · Antinausea medicine: This medicine may be given to calm your stomach and prevent vomiting  · Blood thinners  help prevent blood clots  Blood thinners may be given before, during, and after a surgery or procedure  Blood thinners make it more likely for you to bleed or bruise      · Take your medicine as directed  Call your healthcare provider if you think your medicine is not helping or if you have side effects  Tell him if you are allergic to any medicine  Keep a list of the medicines, vitamins, and herbs you take  Include the amounts, and when and why you take them  Bring the list or the pill bottles to follow-up visits  Carry your medicine list with you in case of an emergency  Follow up with your primary healthcare provider or gynecologist as directed:  Ask how to care for your wound  You may need blood tests, x-rays, or ultrasounds at your follow-up visits  Write down your questions so you remember to ask them during your visits  Limit activity until you have fully recovered from surgery:   · Ask when it is safe for you to drive, walk up stairs, lift heavy objects, and have sex  · Ask when it is okay to exercise, and what types of exercise to do  Start slowly and do more as you get stronger  Contact your primary healthcare provider or gynecologist if:   · You have heavy vaginal bleeding that fills 1 or more sanitary pads in 1 hour  · Your wound opens  · You have a fever, and your wound is red and swollen  · You have yellow, green, or bad-smelling discharge coming from your vagina  · You feel new pain or fullness in your vagina  · You have questions or concerns about your surgery, medicine, or care  Seek care immediately or call 911 if:   · You have new or more blood from your vagina or your wound  · Your arm or leg feels warm, tender, and painful  It may look swollen and red  · You suddenly feel lightheaded and have trouble breathing  · You have chest pain  You may have more pain when you take a deep breath or cough  You may cough up blood  © 2014 [address] is for End User's use only and may not be sold, redistributed or otherwise used for commercial purposes   All illustrations and images included in CareNotes® are the copyrighted property of A  [employer]  or Serafin Cronin  The above information is an  only  It is not intended as medical advice for individual conditions or treatments  Talk to your doctor, nurse or pharmacist before following any medical regimen to see if it is safe and effective for you

## 2018-01-12 LAB
ABO GROUP BLD BPU: NORMAL
ABO GROUP BLD BPU: NORMAL
BPU ID: NORMAL
BPU ID: NORMAL
UNIT DISPENSE STATUS: NORMAL
UNIT DISPENSE STATUS: NORMAL
UNIT PRODUCT CODE: NORMAL
UNIT PRODUCT CODE: NORMAL
UNIT RH: NORMAL
UNIT RH: NORMAL

## 2018-01-12 NOTE — CASE MANAGEMENT
Notification of Discharge  This is a Notification of Discharge from our facility [address]  Please be advised that this patient has been discharge from our facility  Below you will find the admission and discharge date and time including the patients disposition  PRESENTATION DATE: 1/8/2018  8:02 AM  IP ADMISSION DATE: 1/8/18 1716  DISCHARGE DATE: 1/11/2018  2:30 PM  DISPOSITION: Home/Self Care    [address] [facility] in the [facility] by Serafin Cronin for 2017  Network Utilization Review Department  Phone: [phone]; Fax [phone]  ATTENTION: The Network Utilization Review Department is now centralized for our 7 Facilities  Make a note that we have a new phone and fax numbers for our Department  Please call with any questions or concerns to [phone] and carefully follow the prompts so that you are directed to the right person  All voicemails are confidential  Fax any determinations, approvals, denials, and requests for initial or continue stay review clinical to [phone]  Due to HIGH CALL volume, it would be easier if you could please send faxed requests to expedite your requests and in part, help us provide discharge notifications faster

## 2018-01-14 NOTE — MISCELLANEOUS
Message  Given patients anemia (hgb 6  6) which is symptomatic, we will proceed with 2UPRBC transfusion   She will return to see me for discussion of treatment plan      Signatures   Electronically signed by : SHUN Jameson ; Nov 15 2017  8:54PM EST                       (Author)

## 2018-01-22 VITALS
WEIGHT: 184 LBS | HEART RATE: 74 BPM | SYSTOLIC BLOOD PRESSURE: 134 MMHG | HEIGHT: 66 IN | BODY MASS INDEX: 29.57 KG/M2 | DIASTOLIC BLOOD PRESSURE: 72 MMHG

## 2018-01-22 VITALS
SYSTOLIC BLOOD PRESSURE: 143 MMHG | DIASTOLIC BLOOD PRESSURE: 82 MMHG | OXYGEN SATURATION: 97 % | HEART RATE: 64 BPM | WEIGHT: 187 LBS | HEIGHT: 66 IN | BODY MASS INDEX: 30.05 KG/M2

## 2018-01-23 NOTE — MISCELLANEOUS
Message   Recorded as Task   Date: 01/03/2018 02:22 PM, Created By: Arjun Esposito   Task Name: Follow Up   Assigned To: Arjun Esposito   Regarding Patient: Ani Pal, Status: Active   CommentDajudah Me - 03 Jan 2018 2:22 PM     TASK CREATED  Caller: Self; Other; [phone] (Home)    Patient calling states she need a work note for upcoming surgery on 1/8/18  Note needs to include the dates she will be out of work  Routing to provider for dates and approval for note  Arjun Esposito - 03 Jan 2018 2:23 PM     TASK EDITED  Please fax note to [phone]   ATTN: Wilda Leroy - 24 Jan 2018 2:58 PM     TASK REPLIED TO: Previously Assigned To Aditi Casiano  she can have a note   Arjun Esposito - 03 Jan 2018 4:49 PM     TASK EDITED  Letter fax to work per patient request  Left message on voicemail letter was faxed  Active Problems    1  Abnormal uterine bleeding (AUB) (626 9) (N93 9)   2  Bacterial vaginosis (616 10,041 9) (N76 0,B96 89)   3  Fibroid uterus (218 9) (D25 9)   4  Heavy menstrual bleeding (626 2) (N92 0)   5  Screening for diabetes mellitus (V77 1) (Z13 1)    Current Meds   1  Ferrous Sulfate 325 (65 Fe) MG Oral Tablet; TAKE 1 TABLET TWICE DAILY WITH   MEALS; Therapy: 36YPZ6870 to (Evaluate:46Qbc0045)  Requested for: 31QLB8653; Last   Rx:[id number] Ordered   2  MetroNIDAZOLE 500 MG Oral Tablet (Flagyl); Take 1 capsule twice daily; Therapy: 44VHW8237 to (65 563 824)  Requested for: 70Irx3945; Last   Rx:[id number] Ordered   3  Tranexamic Acid 650 MG Oral Tablet; Take two pill three times a day during menses; Therapy: 15EZU0713 to (Evaluate:82Wzy4822)  Requested for: 50YDG8969; Last   Rx:[id number] Ordered    Allergies    1  No Known Drug Allergies    2  No Known Environmental Allergies   3   No Known Food Allergies    Signatures   Electronically signed by : Seth Deshpande, ; Nino  3 2018  4:50PM EST                       (Author)

## 2018-01-23 NOTE — MISCELLANEOUS
Message   Recorded as Task   Date: 12/05/2017 02:25 PM, Created By: Ty Watts   Task Name: Follow Up   Assigned To: James Abraham   Regarding Patient: Manuel Vinson, Status: Active   Comment:    Miriam Grant - 05 Dec 2017 2:25 PM     TASK CREATED  Caller: Self; General Medical Question; [phone] (Home)  PT CALLED, HAS PROCEDURE SCHEDULED 1/8, HAD BLOOD TRANSFUSION A COUPLE OF WEEKS AGO, HER CYCLE STARTED AND HAS BEEN BLEEDING HEAVILY SINCE SATURDAY, LOTS OF BIG CLOTS, IS TAKING BC BUT DOES NOT HELP THE BLEEDING  OK TO LEAVE MESSAGE   spoke with patient  will stop birth control, and begin tranexamic acid  understands that if she takes tranexamic acid with ocp's, it will increase her blood clot risk, so she will stop using ocp's if she begins  Plan  Abnormal uterine bleeding (AUB)    · Tranexamic Acid 650 MG Oral Tablet;  Take two pill three times a day during  menses    Signatures   Electronically signed by : SHUN Bowles ; Dec  6 2017 12:06PM EST                       (Author)

## 2018-01-23 NOTE — MISCELLANEOUS
Message  Return to work or school:    She is able to return to work on  2/19/18      Patient having surgery on 1/8/18  Expected return to work is 2/19/18          Signatures   Electronically signed by : SHUN Kraft ; Nino  3 2018  6:51PM EST                       (Author)

## 2018-01-23 NOTE — MISCELLANEOUS
Message   Recorded as Task   Date: 12/27/2017 02:57 PM, Created By: Brook Swanson   Task Name: Call Back   Assigned To: Brook Swanson   Regarding Patient: Joeline Mortimer, Status: Active   CommentWiljesica Limbnury - 27 Dec 2017 2:57 PM     TASK CREATED  Caller: Self; Results Inquiry; [phone] (Home)    Pt calling states she was in 12/20/17 for preop visit  She  would like to go ahead with surgery on 1/8/18  She would also like the results of pre-op blood work  Pt would also like prescription called into pharmacy on file for bacterial infection discussed at visit on 12/20/17  Routing to provider  Ray Adkins - 27 Dec 2017 6:54 PM     TASK REPLIED TO: Previously Assigned To Constance Claros                      spoke with patient        Active Problems    1  Abnormal uterine bleeding (AUB) (626 9) (N93 9)   2  Bacterial vaginosis (616 10,041 9) (N76 0,B96 89)   3  Fibroid uterus (218 9) (D25 9)   4  Heavy menstrual bleeding (626 2) (N92 0)   5  Screening for diabetes mellitus (V77 1) (Z13 1)    Current Meds   1  Ferrous Sulfate 325 (65 Fe) MG Oral Tablet; TAKE 1 TABLET TWICE DAILY WITH   MEALS; Therapy: 45WFC5458 to (Evaluate:58Idb9965)  Requested for: 11LKR6903; Last   Rx:[id number] Ordered   2  MetroNIDAZOLE 500 MG Oral Tablet (Flagyl); Take 1 capsule twice daily; Therapy: 74VUU2613 to (22 362917)  Requested for: 63Qpu6984; Last   Rx:87Vdg8763 Ordered   3  Tranexamic Acid 650 MG Oral Tablet; Take two pill three times a day during menses; Therapy: 90IQM1067 to (Evaluate:77Wqp6915)  Requested for: 85FUC2453; Last   Rx:80Unl9730 Ordered    Allergies    1  No Known Drug Allergies    2  No Known Environmental Allergies   3   No Known Food Allergies    Signatures   Electronically signed by : Marcell Escalante, ; Dec 28 2017 10:50AM EST                       (Author)

## 2018-01-24 VITALS — BODY MASS INDEX: 29.31 KG/M2 | RESPIRATION RATE: 16 BRPM | WEIGHT: 182.38 LBS | HEART RATE: 76 BPM | HEIGHT: 66 IN

## 2018-01-24 LAB
GLUCOSE SERPL-MCNC: 186 MG/DL (ref 65–140)
GLUCOSE SERPL-MCNC: 96 MG/DL (ref 65–140)

## 2018-01-26 ENCOUNTER — OFFICE VISIT (OUTPATIENT)
Dept: OBGYN CLINIC | Facility: CLINIC | Age: 48
End: 2018-01-26

## 2018-01-26 VITALS
SYSTOLIC BLOOD PRESSURE: 104 MMHG | WEIGHT: 181 LBS | BODY MASS INDEX: 29.09 KG/M2 | HEIGHT: 66 IN | DIASTOLIC BLOOD PRESSURE: 70 MMHG

## 2018-01-26 DIAGNOSIS — N76.0 BACTERIAL VAGINOSIS: ICD-10-CM

## 2018-01-26 DIAGNOSIS — Z09 POSTOP CHECK: Primary | ICD-10-CM

## 2018-01-26 DIAGNOSIS — B96.89 BACTERIAL VAGINOSIS: ICD-10-CM

## 2018-01-26 DIAGNOSIS — R11.0 NAUSEA: ICD-10-CM

## 2018-01-26 PROBLEM — D21.9 FIBROID: Status: RESOLVED | Noted: 2018-01-08 | Resolved: 2018-01-26

## 2018-01-26 PROCEDURE — 99024 POSTOP FOLLOW-UP VISIT: CPT | Performed by: OBSTETRICS & GYNECOLOGY

## 2018-01-26 RX ORDER — ONDANSETRON 4 MG/1
4 TABLET, FILM COATED ORAL EVERY 8 HOURS PRN
Qty: 20 TABLET | Refills: 0 | Status: SHIPPED | OUTPATIENT
Start: 2018-01-26 | End: 2018-02-09 | Stop reason: ALTCHOICE

## 2018-01-26 RX ORDER — METRONIDAZOLE 500 MG/1
500 TABLET ORAL EVERY 12 HOURS SCHEDULED
Qty: 14 TABLET | Refills: 0 | Status: SHIPPED | OUTPATIENT
Start: 2018-01-26 | End: 2018-02-02

## 2018-01-26 NOTE — PROGRESS NOTES
Post Op Note  Shirley Garcia 52 y o  female MRN: [id number]  Unit/Bed#:  Encounter: [id number]    Shirley Garcia had a [facility] BS Cysto on 1/8/18 for a fibroid uterus  Today, she presents complaining of nausea  She states that she hasn't vomitted, but has noticed that she has been nauseas for a couple days  She ate a burger, and is trying to trefrain from greasy food  Patient has + bm, + flatus, + urination  She feels winded when walking too long, and has trouble getting up and down the stairs  She has anxiety when she thinks about putting on her clothes  She had an episode of vaginal bleeding which has resolved  She states it was spotting  Denies chest pain, denies fevers, tenderness, chills      /70   Ht 5' 6" (1 676 m)   Wt 82 1 kg (181 lb)   LMP 11/08/2017 (Within Days)   BMI 29 21 kg/m²     Pathology results:  Uterus, cervix, bilateral fallopian tubes (627 g hysterectomy and bilateral salpingectomy):     - Uterus: Leiomyomata, foci of adenomyosis and inactive endometrium with polyp formation      - Cervix: Nabothian cyst formation      - Fallopian tubes: Foci of endometriosis and paratubal cyst formation      - No atypia or malignancy identified  Physical Exam  Gen:NAD  CVS: RRR  Lungs: BLCATA  Abdomen: incisions c/d/i   + bowel sounds, soft nontender  : Vaginal cuff in tact  Vagina discharge consistent with bacterial vaginosis  A/P: 2 week post op from [address] [place] [address], BS, cysto for fibroid uterus  Diagnoses and all orders for this visit:    Postop check    Bacterial vaginosis  -     metroNIDAZOLE (FLAGYL) 500 mg tablet; Take 1 tablet by mouth every 12 (twelve) hours for 7 days    Nausea  -     ondansetron (ZOFRAN) 4 mg tablet; Take 1 tablet by mouth every 8 (eight) hours as needed for nausea or vomiting    will have clinical recheck in 2 weeks  Patient will call sooner if any fevers or problems arise  Will continue to eat small meals and advance activity daily as tolerated    Continue pelvic rest

## 2018-02-09 ENCOUNTER — OFFICE VISIT (OUTPATIENT)
Dept: OBGYN CLINIC | Facility: CLINIC | Age: 48
End: 2018-02-09

## 2018-02-09 VITALS — BODY MASS INDEX: 30.38 KG/M2 | SYSTOLIC BLOOD PRESSURE: 116 MMHG | WEIGHT: 188.2 LBS | DIASTOLIC BLOOD PRESSURE: 76 MMHG

## 2018-02-09 DIAGNOSIS — Z09 POSTOP CHECK: Primary | ICD-10-CM

## 2018-02-09 PROCEDURE — 99024 POSTOP FOLLOW-UP VISIT: CPT | Performed by: OBSTETRICS & GYNECOLOGY

## 2018-02-09 NOTE — PROGRESS NOTES
Post Op Visit  Buster Pandya 52 y o  female MRN: [id number]    Encounter: [id number]    Subjective:   patient is 4 weeks status post total abdominal hysterectomy, bilateral salpingectomy, cystoscopy for fibroid uterus and abnormal uterine bleeding  She is currently doing well  Her pain during bowel movements has now resolved  She is no longer nauseous  Patient denies any bleeding or vaginal discharge  Patient feels like she has more energy and overall feels well  She is having regular bowel movements, is urinating without any problems  Pain is no longer present  Patient is ambulating, but slowly  Denies any done discharge from her incision  Denies chest pain shortness of breath nausea vomiting or fevers  /76   Wt 85 4 kg (188 lb 3 2 oz)   LMP 11/08/2017 (Within Days)   BMI 30 38 kg/m²     Physical Exam  Gen:NAD  CVS: RRR, no murmurs  Lungs: BLCTA, no crackles or rales  Abdomen: + BS, nontender, non distended, incision well healed (pfannensteil)    dReview of Systems   Constitutional: Negative  HENT: Negative  Eyes: Negative  Respiratory: Negative  Cardiovascular: Negative  Gastrointestinal: Negative  Endocrine: Negative  Genitourinary: Negative  Musculoskeletal: Negative  Psychiatric/Behavioral: Negative     :    A/P: 4 weeks s/p RALPH BS cysto doing well  1) post op- doing well, continue current management  Pelvic rest until appointment in 4 weeks  2) anemia- patient currently taking iron pills  No longer has any bleeding    Can stop iron pills  3) Diet:  Continue current fiber diet   -Follow up in 4 weeks

## 2018-03-02 ENCOUNTER — OFFICE VISIT (OUTPATIENT)
Dept: OBGYN CLINIC | Facility: CLINIC | Age: 48
End: 2018-03-02

## 2018-03-02 VITALS — SYSTOLIC BLOOD PRESSURE: 112 MMHG | DIASTOLIC BLOOD PRESSURE: 74 MMHG | BODY MASS INDEX: 31.25 KG/M2 | WEIGHT: 193.6 LBS

## 2018-03-02 DIAGNOSIS — Z09 POSTOP CHECK: Primary | ICD-10-CM

## 2018-03-02 PROCEDURE — 99024 POSTOP FOLLOW-UP VISIT: CPT | Performed by: OBSTETRICS & GYNECOLOGY

## 2018-03-02 NOTE — PROGRESS NOTES
Jordyn Delay 50 y o  female MRN: [id number]    Patient has no current complaints  Pain is none  Patient has cain  She is ambulating  Patient is currently passing flatus and has had normal bowel movement  She is tolerating PO, and denies nausea or vomitting  Patient denies fever, chills, chest pain, shortness of breath, or calf tenderness  Vitals: Blood pressure 112/74, weight 87 8 kg (193 lb 9 6 oz), last menstrual period 11/08/2017, not currently breastfeeding  ,Body mass index is 31 25 kg/m²  Physical Exam:   GEN: NAD, in bed  ABDOMEN: normal bowel sounds, soft, no tenderness, no distention  Incision: C/D/I on abdomen  Well healed  Pelvis: vaginal cuff intact  No defects noted  Well healing  No bleeding  Ext: NT, +SCDs      Patient Active Problem List   Diagnosis    S/P abdominal hysterectomy        A/P: 50 y o  female s/p RALPH BS cysto on 1/8/19 doing well  1) Pain: none  2) Patient has had intercourse one day and had no complaints  Denied bleeding  Patient has no complaints today  No nausea or vomitting  Can slowly increase exercise and resume normal activity  Will follow up in 6 months for annual   Pap smear was negative  Will go back to work In 2 weeks    Works at [employer]-4  3/2/2018 10:09 AM

## 2018-03-16 ENCOUNTER — TELEPHONE (OUTPATIENT)
Dept: OBGYN CLINIC | Facility: CLINIC | Age: 48
End: 2018-03-16

## 2018-03-16 DIAGNOSIS — M79.604 RIGHT LEG PAIN: Primary | ICD-10-CM

## 2018-03-16 NOTE — TELEPHONE ENCOUNTER
Spoke with patient and central scheduling    Sounds musculoskeletal  1:30 appointment for venous dopplers on Monday at [facility]

## 2018-03-16 NOTE — TELEPHONE ENCOUNTER
Patient c/o right thigh feels like she is going to get a muscle spasm  It is happens often  Denies any chest pain, SOB, palpations, no swelling  Worse with movement  She has tried stretching no change  More of an ache/spasm not really pain  No redness but it is slightly warmer than the other part of her leg  Pt advise to monitor call if worsens, SOB, palpations, chest pain  Pt verbalized understanding  Routing to provider for advise

## 2018-03-19 ENCOUNTER — HOSPITAL ENCOUNTER (OUTPATIENT)
Dept: ULTRASOUND IMAGING | Facility: HOSPITAL | Age: 48
Discharge: HOME | End: 2018-03-19
Attending: OBSTETRICS & GYNECOLOGY
Payer: COMMERCIAL

## 2018-03-19 ENCOUNTER — TELEPHONE (OUTPATIENT)
Dept: OBGYN CLINIC | Facility: CLINIC | Age: 48
End: 2018-03-19

## 2018-03-19 DIAGNOSIS — M79.604 RIGHT LEG PAIN: ICD-10-CM

## 2018-03-19 PROCEDURE — 93971 EXTREMITY STUDY: CPT

## 2018-03-19 PROCEDURE — 93971 EXTREMITY STUDY: CPT | Performed by: SURGERY

## 2018-03-19 NOTE — TELEPHONE ENCOUNTER
Patient is calling regarding doppler studies  She is dropping off FMLA paperwork  She is scheduled to go back to work today  Dropping off forms incase she results are positive  Please review results and notify patient if she is not to return to work  Routing to provider

## 2018-04-04 ENCOUNTER — TELEPHONE (OUTPATIENT)
Dept: OBGYN CLINIC | Facility: CLINIC | Age: 48
End: 2018-04-04

## 2018-04-04 NOTE — TELEPHONE ENCOUNTER
Patient states she had intercourse c/o lower abdominal pain started Friday afternoon 2/10  Light pink spotting with wiping also started Friday after noon  Pelvic rest for now  Routing to provider for further advise

## 2018-04-11 NOTE — TELEPHONE ENCOUNTER
Patient states pain is increasing at times  It is a sharp shooting pain so strong that it embolizes her  Then other times it is just cramps  States she at times she is having dark red blood with wiping  Advised patient to pelvic rest for one month  Routing to provider to call patient

## 2018-04-11 NOTE — TELEPHONE ENCOUNTER
Spoke with patient  She is wondering whether she should be working from home  Discussed with her that she should keep a diary of how often she has the pain, and see if that is associated with work    She will call you at the end of the month to let you know

## 2018-04-19 NOTE — TELEPHONE ENCOUNTER
Arlyn Mathis called wanted to let you know she is going to be sending you a form to fill out so she is able to work from home

## 2018-06-12 ENCOUNTER — TELEPHONE (OUTPATIENT)
Dept: OBGYN CLINIC | Facility: CLINIC | Age: 48
End: 2018-06-12

## 2018-06-12 NOTE — TELEPHONE ENCOUNTER
Pt called,would like her medical accomodation extended, said 4 weeks was not enough and hasn't felt any change  Medical accomodations  on

## 2018-06-14 NOTE — TELEPHONE ENCOUNTER
She should be able to go back to work now    Please have her come in for an appointment to be evaluated

## 2018-06-15 ENCOUNTER — OFFICE VISIT (OUTPATIENT)
Dept: OBGYN CLINIC | Facility: CLINIC | Age: 48
End: 2018-06-15
Payer: COMMERCIAL

## 2018-06-15 VITALS — SYSTOLIC BLOOD PRESSURE: 114 MMHG | DIASTOLIC BLOOD PRESSURE: 76 MMHG | BODY MASS INDEX: 30.83 KG/M2 | WEIGHT: 191 LBS

## 2018-06-15 DIAGNOSIS — Z90.710 S/P ABDOMINAL HYSTERECTOMY: Primary | ICD-10-CM

## 2018-06-15 DIAGNOSIS — L76.82 INCISIONAL PAIN: ICD-10-CM

## 2018-06-15 PROCEDURE — 99213 OFFICE O/P EST LOW 20 MIN: CPT | Performed by: OBSTETRICS & GYNECOLOGY

## 2018-06-15 RX ORDER — IBUPROFEN 800 MG/1
TABLET ORAL
COMMUNITY
Start: 2018-06-06 | End: 2018-09-01 | Stop reason: ALTCHOICE

## 2018-06-15 NOTE — PROGRESS NOTES
Assessment/Plan:  Problem List Items Addressed This Visit        Other    S/P abdominal hysterectomy - Primary      Other Visit Diagnoses     Incisional pain              Patient can work from home for 2 more weeks, but will have to go back to working from the office after this  She is aware that I will not extend her leave any longer, and is agreeable    Subjective     Dorian Dickson is a 50 y o  female here for a problem visit  Patient is complaining of pain  Patient had a RALPH in January, and on Tuesday (three days ago), patient fell on the sidewalk while running to catch a bus  She felt a lot of incisional pain at that point  She denies any vaginal pain or discharge  She has scratches and healing wounds on her left knee  She has been working from home on Monday and Friday since her surgery, and believes that this may help her feel better if she can get it extended for two weeks  Is currently sexually active with no problems  Patient Active Problem List   Diagnosis    S/P abdominal hysterectomy    Right leg pain       Gynecologic History  Patient's last menstrual period was 11/08/2017   hysterectomy  The following portions of the patient's history were reviewed and updated as appropriate: allergies, current medications, past family history, past medical history, past social history, past surgical history and problem list     Review of Systems  Pertinent items are noted in HPI  Objective    /76   Wt 86 6 kg (191 lb)   LMP 11/08/2017   BMI 30 83 kg/m²    GEN: The patient was alert and oriented x3, pleasant well-appearing female in no acute distress  PELVIC:  Normal appearing external female genitalia, normal vaginal epithelium, No discharge  Cervix absent   Bimanual: absent CMT,  Surgically absent uterus,    No palpable adnexal masses      Abdomen: incision well healed, no palpable masses

## 2018-09-01 ENCOUNTER — HOSPITAL ENCOUNTER (EMERGENCY)
Facility: HOSPITAL | Age: 48
Discharge: HOME | End: 2018-09-01
Attending: EMERGENCY MEDICINE
Payer: COMMERCIAL

## 2018-09-01 VITALS
OXYGEN SATURATION: 99 % | RESPIRATION RATE: 16 BRPM | WEIGHT: 195 LBS | DIASTOLIC BLOOD PRESSURE: 88 MMHG | SYSTOLIC BLOOD PRESSURE: 141 MMHG | HEART RATE: 63 BPM | BODY MASS INDEX: 31.34 KG/M2 | TEMPERATURE: 98.3 F | HEIGHT: 66 IN

## 2018-09-01 DIAGNOSIS — M54.2 NECK PAIN: Primary | ICD-10-CM

## 2018-09-01 DIAGNOSIS — M43.6 TORTICOLLIS: ICD-10-CM

## 2018-09-01 PROCEDURE — 99283 EMERGENCY DEPT VISIT LOW MDM: CPT

## 2018-09-01 PROCEDURE — 96372 THER/PROPH/DIAG INJ SC/IM: CPT

## 2018-09-01 RX ORDER — CYCLOBENZAPRINE HCL 10 MG
10 TABLET ORAL 3 TIMES DAILY PRN
Qty: 15 TABLET | Refills: 0 | Status: SHIPPED | OUTPATIENT
Start: 2018-09-01 | End: 2018-10-12

## 2018-09-01 RX ORDER — KETOROLAC TROMETHAMINE 30 MG/ML
15 INJECTION, SOLUTION INTRAMUSCULAR; INTRAVENOUS ONCE
Status: COMPLETED | OUTPATIENT
Start: 2018-09-01 | End: 2018-09-01

## 2018-09-01 RX ORDER — NAPROXEN 500 MG/1
500 TABLET ORAL 2 TIMES DAILY WITH MEALS
Qty: 14 TABLET | Refills: 0 | Status: SHIPPED | OUTPATIENT
Start: 2018-09-01 | End: 2018-10-12

## 2018-09-01 RX ORDER — CYCLOBENZAPRINE HCL 10 MG
10 TABLET ORAL ONCE
Status: COMPLETED | OUTPATIENT
Start: 2018-09-01 | End: 2018-09-01

## 2018-09-01 RX ADMIN — KETOROLAC TROMETHAMINE 15 MG: 30 INJECTION, SOLUTION INTRAMUSCULAR at 08:13

## 2018-09-01 RX ADMIN — CYCLOBENZAPRINE HYDROCHLORIDE 10 MG: 10 TABLET, FILM COATED ORAL at 08:13

## 2018-09-01 NOTE — DISCHARGE INSTRUCTIONS
Acute Neck Pain   WHAT YOU NEED TO KNOW:   Acute neck pain starts suddenly, increases quickly, and goes away in a few days  The pain may come and go, or be worse with certain movements  The pain may be only in your neck, or it may move to your arms, back, or shoulders  You may also have pain that starts in another body area and moves to your neck  DISCHARGE INSTRUCTIONS:   Return to the emergency department if:   · You have an injury that causes neck pain and shooting pain down your arms or legs  · Your neck pain suddenly becomes severe  · You have neck pain along with numbness, tingling, or weakness in your arms or legs  · You have a stiff neck, a headache, and a fever  Contact your healthcare provider if:   · You have new or worsening symptoms  · Your symptoms continue even after treatment  · You have questions or concerns about your condition or care  Medicines:   · NSAIDs , such as ibuprofen, help decrease swelling, pain, and fever  This medicine is available without a doctor's order  Ask your healthcare provider which medicine to take and how often to take it  Follow directions  NSAIDs can cause stomach bleeding or kidney problems if not taken correctly  If you take blood thinner medicine, always ask if NSAIDs are safe for you  · Acetaminophen  helps decrease pain and fever  Ask your healthcare provider how much to take and how often to take it  Follow directions  Acetaminophen can cause liver damage if not taken correctly  · Steroid medicine  may be used to reduce inflammation  This can help relieve pain caused by swelling  · Take your medicine as directed  Contact your healthcare provider if you think your medicine is not helping or if you have side effects  Tell him or her if you are allergic to any medicine  Keep a list of the medicines, vitamins, and herbs you take  Include the amounts, and when and why you take them  Bring the list or the pill bottles to follow-up visits  Carry your medicine list with you in case of an emergency  Manage or prevent acute neck pain:   · Rest your neck as directed  Do not make sudden movements, such as turning your head quickly  Your healthcare provider may recommend you wear a cervical collar for a short time  The collar will prevent you from moving your head  This will give your neck time to heal if an injury is causing your neck pain  Ask your healthcare provider when you can return to sports or other normal daily activities  · Apply heat as directed  Heat helps relieve pain and swelling  Use a heat wrap, or soak a small towel in warm water  Wring out the extra water  Apply the heat wrap or towel for 20 minutes every hour, or as directed  · Apply ice as directed  Ice helps relieve pain and swelling, and can help prevent tissue damage  Use an ice pack, or put ice in a bag  Cover the ice pack or back with a towel before you apply it to your neck  Apply the ice pack or ice for 15 minutes every hour, or as directed  Your healthcare provider can tell you how often to apply ice  · Do neck exercises as directed  Neck exercises help strengthen the muscles and increase range of motion  Your healthcare provider will tell you which exercises are right for you  He may give you instructions, or he may recommend that you work with a physical therapist  Your healthcare provider or therapist can make sure you are doing the exercises correctly  · Maintain good posture  Try to keep your head and shoulders lifted when you sit  If you work in front of a computer, make sure the monitor is at the right level  You should not need to look up down to see the screen  You should also not have to lean forward to be able to read what is on the screen  Make sure your keyboard, mouse, and other computer items are placed where you do not have to extend your shoulder to reach them  Get up often if you work in front of a computer or sit for long periods of time  Stretch or walk around to keep your neck muscles loose  Follow up with your healthcare provider as directed: Your healthcare provider may refer you to a specialist if your pain does not get better with treatment  Write down your questions so you remember to ask them during your visits  © 2017 [employer] Information is for End User's use only and may not be sold, redistributed or otherwise used for commercial purposes  All illustrations and images included in CareNotes® are the copyrighted property of A D A M , Inc  or Serafin Cronin  The above information is an  only  It is not intended as medical advice for individual conditions or treatments  Talk to your doctor, nurse or pharmacist before following any medical regimen to see if it is safe and effective for you  Spasmodic Torticollis   WHAT YOU NEED TO KNOW:   Spasmodic torticollis, also called cervical dystonia, is a condition that causes your neck muscles to contract abnormally  Your neck may twist and cause your head to tilt to one side, forward, or backward  Spasmodic torticollis may occur occasionally or continuously  It often gets worse with stress  DISCHARGE INSTRUCTIONS:   Medicines: You may need any of the following:  · Muscle relaxers  decrease pain and muscle spasms  · Botulinum toxin injections  may be given to relax your muscles  · NSAIDs , such as ibuprofen, help decrease swelling, pain, and fever  This medicine is available with or without a doctor's order  NSAIDs can cause stomach bleeding or kidney problems in certain people  If you take blood thinner medicine, always ask if NSAIDs are safe for you  Always read the medicine label and follow directions  Do not give these medicines to children under 10months of age without direction from your child's healthcare provider  · Acetaminophen  decreases pain  It is available without a doctor's order   Ask how much to take and how often to take it  Follow directions  Acetaminophen can cause liver damage if not taken correctly  · Prescription pain medicine  may be given  Ask your healthcare provider how to take this medicine safely  · Take your medicine as directed  Contact your healthcare provider if you think your medicine is not helping or if you have side effects  Tell him if you are allergic to any medicine  Keep a list of the medicines, vitamins, and herbs you take  Include the amounts, and when and why you take them  Bring the list or the pill bottles to follow-up visits  Carry your medicine list with you in case of an emergency  Follow up with your healthcare provider as directed:  Write down your questions so you remember to ask them during your visits  Self-care:   · Apply ice  on your neck for 15 to 20 minutes every hour or as directed  Use an ice pack, or put crushed ice in a plastic bag  Cover it with a towel  Ice helps prevent tissue damage and decreases swelling and pain  · Apply heat  on your neck for 20 to 30 minutes every 2 hours for as many days as directed  Heat helps decrease pain and muscle spasms  · Rest  as needed  Return to your daily activities as directed  · Wear the cervical collar  as directed  A cervical collar may be needed to support your neck  Contact your healthcare provider if:   · You have a fever  · You have swelling in your neck area that gets worse or does not go away  · You have an increased feeling of sadness or loneliness, or you feel depressed  · You have questions or concerns about your condition or care  Seek care immediately or call 911 if:   · You have increased pain in your neck or shoulder  · You have sudden shortness of breath  · You have trouble moving your arms or legs  · Your arms or legs feel numb  © 2017 [employer]  Information is for End User's use only and may not be sold, redistributed or otherwise used for commercial purposes   All illustrations and images included in CareNotes® are the copyrighted property of A D A M , Inc  or Serafin Cronin  The above information is an  only  It is not intended as medical advice for individual conditions or treatments  Talk to your doctor, nurse or pharmacist before following any medical regimen to see if it is safe and effective for you

## 2018-09-01 NOTE — ED PROVIDER NOTES
History  Chief Complaint   Patient presents with    Neck Pain     Pt c/o left shoulder and neck pain getting progressively worse over past 5 days  Hurts to turn head  Patient presents for evaluation of neck pain  States started 5 days ago  Would start as shoulder pain in the morning and get better  Today was worse and she cant turn her head now  States this happened about 1-2 years ago and was treated with muscle relaxers and improved  Denies any fall or trauma  History provided by:  Patient   used: No    Neck Pain       None       Past Medical History:   Diagnosis Date    History of transfusion 11/2017    given due to Hgb at 6 6, no rx       Past Surgical History:   Procedure Laterality Date    NE CYSTOURETHROSCOPY N/A 1/8/2018    Procedure: CYSTOSCOPY;  Surgeon: Saran Perez MD;  [facility];  Service: Gynecology    NE LAPAROSCOPY W TOT HYSTERECTUTERUS <=250 [address] [address]  W TUBE/OVARY N/A 1/8/2018    Procedure: TOTAL LAPAROSCOPIC HYSTERECTOMY; BILATERAL SALPINGECTOMY;  Surgeon: Saran Perez MD;  [facility];  Service: Gynecology    NE TOTAL ABDOM HYSTERECTOMY N/A 1/8/2018    Procedure: TOTAL ABDOMINAL HYSTERECTOMY;  Surgeon: Saran Perez MD;  [facility];  Service: Gynecology    SALPINGECTOMY Bilateral 1/8/2018    Procedure: SALPINGECTOMY;  Surgeon: Saran Perez MD;  [facility];  Service: Gynecology    WISDOM TOOTH EXTRACTION         History reviewed  No pertinent family history  I have reviewed and agree with the history as documented  Social History   Substance Use Topics    Smoking status: Former Smoker     Quit date: 2008    Smokeless tobacco: Never Used    Alcohol use 1 2 oz/week     2 Glasses of wine per week        Review of Systems   Musculoskeletal: Positive for neck pain  All other systems reviewed and are negative        Physical Exam  Physical Exam   Constitutional: She is oriented to person, place, and time  No distress  HENT:   Head: Atraumatic  Mouth/Throat: Oropharynx is clear and moist    Eyes: Pupils are equal, round, and reactive to light  Neck:       Cardiovascular: Normal rate, regular rhythm and intact distal pulses  Pulmonary/Chest: Effort normal and breath sounds normal    Neurological: She is alert and oriented to person, place, and time  No cranial nerve deficit or sensory deficit  She exhibits normal muscle tone  Skin: Capillary refill takes less than 2 seconds  She is not diaphoretic  Nursing note and vitals reviewed        Vital Signs  ED Triage Vitals [09/01/18 0754]   Temperature Pulse Respirations Blood Pressure SpO2   98 3 °F (36 8 °C) 63 16 141/88 99 %      Temp src Heart Rate Source Patient Position - Orthostatic VS BP Location FiO2 (%)   -- -- -- -- --      Pain Score       7           Vitals:    09/01/18 0754   BP: 141/88   Pulse: 63       Visual Acuity      ED Medications  Medications   ketorolac (TORADOL) injection 15 mg (not administered)   cyclobenzaprine (FLEXERIL) tablet 10 mg (not administered)       Diagnostic Studies  Results Reviewed     None                 No orders to display              Procedures  Procedures       Phone Contacts  ED Phone Contact    ED Course                               MDM  Number of Diagnoses or Management Options  Neck pain:   Torticollis:   Diagnosis management comments: Pulse ox 99% on RA indicating adequate oxygenation       Amount and/or Complexity of Data Reviewed  Decide to obtain previous medical records or to obtain history from someone other than the patient: yes  Review and summarize past medical records: yes    Patient Progress  Patient progress: stable    CritCare Time    Disposition  Final diagnoses:   Neck pain   Torticollis     Time reflects when diagnosis was documented in both MDM as applicable and the Disposition within this note     Time User Action Codes Description Comment    9/1/2018  8:08 AM Beny Thersia Robby Add [M54 2] Neck pain     9/1/2018  8:08 AM Mike Mo, Mindyconor Robby Add [M43 6] Torticollis       ED Disposition     ED Disposition Condition Comment    Discharge  [facility] discharge to home/self care  Condition at discharge: stable        Follow-up Information     Follow up With Specialties Details Why [id number] [facility],  Obstetrics and Gynecology, Obstetrics, Gynecology In 1 week  [address] [place] [address] 105  [phone]            Patient's Medications   Discharge Prescriptions    CYCLOBENZAPRINE (FLEXERIL) 10 MG TABLET    Take 1 tablet (10 mg total) by mouth 3 (three) times a day as needed for muscle spasms for up to 15 doses       Start Date: 9/1/2018  End Date: --       Order Dose: 10 mg       Quantity: 15 tablet    Refills: 0    NAPROXEN (NAPROSYN) 500 MG TABLET    Take 1 tablet (500 mg total) by mouth 2 (two) times a day with meals for 7 days       Start Date: 9/1/2018  End Date: 9/8/2018       Order Dose: 500 mg       Quantity: 14 tablet    Refills: 0     No discharge procedures on file      ED Provider  Electronically Signed by           Leticia Yu DO  09/01/18 5273

## 2018-10-12 ENCOUNTER — ANNUAL EXAM (OUTPATIENT)
Dept: OBGYN CLINIC | Facility: CLINIC | Age: 48
End: 2018-10-12
Payer: COMMERCIAL

## 2018-10-12 VITALS — BODY MASS INDEX: 33.17 KG/M2 | WEIGHT: 202.4 LBS | SYSTOLIC BLOOD PRESSURE: 110 MMHG | DIASTOLIC BLOOD PRESSURE: 72 MMHG

## 2018-10-12 DIAGNOSIS — Z01.419 WELL FEMALE EXAM WITH ROUTINE GYNECOLOGICAL EXAM: Primary | ICD-10-CM

## 2018-10-12 DIAGNOSIS — Z12.31 ENCOUNTER FOR SCREENING MAMMOGRAM FOR MALIGNANT NEOPLASM OF BREAST: ICD-10-CM

## 2018-10-12 DIAGNOSIS — Z12.11 ENCOUNTER FOR SCREENING COLONOSCOPY: ICD-10-CM

## 2018-10-12 PROCEDURE — 99396 PREV VISIT EST AGE 40-64: CPT | Performed by: OBSTETRICS & GYNECOLOGY

## 2018-10-12 NOTE — PROGRESS NOTES
ASSESSMENT & PLAN: Yee Topete is a 50 y o  [id number] with normal gynecologic exam     1   Routine well woman exam done today  2  Pap and HPV:  The patient's last pap was 2017  Pap and cotesting was not done today  Current ASCCP Guidelines reviewed  No more paps indicated  S/p total hysterectomy for benign cause(fibroids)  3  Mammogram ordered  4  The following were reviewed in today's visit: breast self exam, mammography screening ordered, adequate intake of calcium and vitamin D, exercise and healthy diet  5  Colonoscopy referral given      CC:  Annual Gynecologic Examination    HPI: Yee Topete is a 50 y o  [id number] who presents for annual gynecologic examination  She has the following concerns:  none    Health Maintenance:    She exercises 4 days per week with walking  She wears her seatbelt routinely  She does perform regular monthly self breast exams  She feels safe at home  Patients does not follow a particular diet        Her last pap was 2017   Last mammogram: never  Last Colonoscopy: never    Past Medical History:   Diagnosis Date    History of transfusion 11/2017    given due to Hgb at 6 6, no rx       Past Surgical History:   Procedure Laterality Date    ME CYSTOURETHROSCOPY N/A 1/8/2018    Procedure: CYSTOSCOPY;  Surgeon: Ana Cristina Zabala MD;  [facility];  Service: Gynecology    ME LAPAROSCOPY [place] <=[address]  W TUBE/OVARY N/A 1/8/2018    Procedure: TOTAL LAPAROSCOPIC HYSTERECTOMY; BILATERAL SALPINGECTOMY;  Surgeon: Ana Cristina Zabala MD;  [facility];  Service: Gynecology    ME TOTAL ABDOM HYSTERECTOMY N/A 1/8/2018    Procedure: TOTAL ABDOMINAL HYSTERECTOMY;  Surgeon: Ana Cristina Zabala MD;  [facility];  Service: Gynecology    SALPINGECTOMY Bilateral 1/8/2018    Procedure: SALPINGECTOMY;  Surgeon: Ana Cristina Zabala MD;  [facility];  Service: Gynecology    WISDOM TOOTH EXTRACTION         Past OB/Gyn History:  OB History      Para Term  AB Living    4 4 4     4    SAB TAB Ectopic Multiple Live Births            4           Patient's last menstrual period was 2017  History of sexually transmitted infection No  History of abnormal pap smears  No     Patient is currently sexually active  heterosexual Birth control: none  (hysterectomy)    History reviewed  No pertinent family history  Social History:  Social History     Social History    Marital status: Single     Spouse name: N/A    Number of children: N/A    Years of education: N/A     Occupational History    Not on file  Social History Main Topics    Smoking status: Former Smoker     Quit date:     Smokeless tobacco: Never Used    Alcohol use 1 2 oz/week     2 Glasses of wine per week    Drug use: No    Sexual activity: Yes     Partners: Male     Birth control/ protection: Female Sterilization     Other Topics Concern    Not on file     Social History Narrative    No narrative on file     Presently lives with  and children  Patient is   Patient is currently employed in [place]    No Known Allergies  No current outpatient prescriptions on file  Review of Systems:  A complete review of systems was performed and was negative, except as listed  none    Physical Exam:  /72   Wt 91 8 kg (202 lb 6 4 oz)   LMP 2017   BMI 33 17 kg/m²    GEN: The patient was alert and oriented x3, pleasant well-appearing female in no acute distress  HEENT:  Unremarkable, no anterior or posterior lymphadenopathy, no thyromegaly  CV:  RRR, no murmurs  RESP:  Clear to auscultation bilaterally  BREAST:  Symmetric breasts with no palpable breast masses or obvious breast lesions  She has no retractions or nipple discharge  She has no axillary abnormalities or palpable masses  Self breast exam is taught    ABD:  Soft, nontender, nondistended, normoactive bowel sounds,   EXT:  WWP, nontender, no edema  BACK:  No CVA tenderness, no tenderness to palpation along spine  PELVIC:  Normal appearing external female genitalia, normal vaginal epithelium, No discharge  Cervix surgically absent   Bimanual:  Surgically absent uterus No palpable adnexal masses

## 2019-04-18 ENCOUNTER — OFFICE VISIT (OUTPATIENT)
Dept: FAMILY MEDICINE CLINIC | Facility: CLINIC | Age: 49
End: 2019-04-18
Payer: COMMERCIAL

## 2019-04-18 VITALS
BODY MASS INDEX: 33.14 KG/M2 | WEIGHT: 206.2 LBS | OXYGEN SATURATION: 98 % | SYSTOLIC BLOOD PRESSURE: 118 MMHG | RESPIRATION RATE: 16 BRPM | HEIGHT: 66 IN | DIASTOLIC BLOOD PRESSURE: 70 MMHG | HEART RATE: 72 BPM

## 2019-04-18 DIAGNOSIS — Z00.00 ANNUAL PHYSICAL EXAM: ICD-10-CM

## 2019-04-18 DIAGNOSIS — Z13.29 SCREENING FOR THYROID DISORDER: ICD-10-CM

## 2019-04-18 DIAGNOSIS — Z00.00 PREVENTATIVE HEALTH CARE: Primary | ICD-10-CM

## 2019-04-18 DIAGNOSIS — Z13.6 SCREENING FOR CARDIOVASCULAR CONDITION: ICD-10-CM

## 2019-04-18 DIAGNOSIS — Z12.31 VISIT FOR SCREENING MAMMOGRAM: ICD-10-CM

## 2019-04-18 DIAGNOSIS — R53.83 FATIGUE, UNSPECIFIED TYPE: ICD-10-CM

## 2019-04-18 DIAGNOSIS — R73.01 ELEVATED FASTING BLOOD SUGAR: ICD-10-CM

## 2019-04-18 PROCEDURE — 99386 PREV VISIT NEW AGE 40-64: CPT | Performed by: FAMILY MEDICINE

## 2019-04-29 ENCOUNTER — HOSPITAL ENCOUNTER (EMERGENCY)
Facility: HOSPITAL | Age: 49
Discharge: HOME | End: 2019-04-29
Attending: EMERGENCY MEDICINE
Payer: COMMERCIAL

## 2019-04-29 ENCOUNTER — APPOINTMENT (EMERGENCY)
Dept: RADIOLOGY | Facility: HOSPITAL | Age: 49
End: 2019-04-29
Payer: COMMERCIAL

## 2019-04-29 VITALS
SYSTOLIC BLOOD PRESSURE: 136 MMHG | RESPIRATION RATE: 16 BRPM | OXYGEN SATURATION: 97 % | HEART RATE: 88 BPM | TEMPERATURE: 98.2 F | BODY MASS INDEX: 34.25 KG/M2 | WEIGHT: 209 LBS | DIASTOLIC BLOOD PRESSURE: 78 MMHG

## 2019-04-29 DIAGNOSIS — S83.90XA KNEE SPRAIN: Primary | ICD-10-CM

## 2019-04-29 PROCEDURE — 99283 EMERGENCY DEPT VISIT LOW MDM: CPT

## 2019-04-29 PROCEDURE — 73564 X-RAY EXAM KNEE 4 OR MORE: CPT

## 2019-04-29 RX ORDER — IBUPROFEN 600 MG/1
600 TABLET ORAL 3 TIMES DAILY
Qty: 21 TABLET | Refills: 0 | Status: SHIPPED | OUTPATIENT
Start: 2019-04-29 | End: 2020-03-03

## 2019-05-05 ENCOUNTER — TELEPHONE (OUTPATIENT)
Dept: OTHER | Facility: OTHER | Age: 49
End: 2019-05-05

## 2019-05-06 ENCOUNTER — OFFICE VISIT (OUTPATIENT)
Dept: FAMILY MEDICINE CLINIC | Facility: CLINIC | Age: 49
End: 2019-05-06
Payer: COMMERCIAL

## 2019-05-06 VITALS
DIASTOLIC BLOOD PRESSURE: 70 MMHG | WEIGHT: 207 LBS | BODY MASS INDEX: 33.27 KG/M2 | OXYGEN SATURATION: 96 % | TEMPERATURE: 97.3 F | SYSTOLIC BLOOD PRESSURE: 132 MMHG | HEART RATE: 76 BPM | HEIGHT: 66 IN

## 2019-05-06 DIAGNOSIS — M25.562 ACUTE PAIN OF LEFT KNEE: Primary | ICD-10-CM

## 2019-05-06 PROCEDURE — 99214 OFFICE O/P EST MOD 30 MIN: CPT | Performed by: FAMILY MEDICINE

## 2019-05-07 ENCOUNTER — OFFICE VISIT (OUTPATIENT)
Dept: OBGYN CLINIC | Facility: OTHER | Age: 49
End: 2019-05-07
Payer: COMMERCIAL

## 2019-05-07 VITALS
SYSTOLIC BLOOD PRESSURE: 133 MMHG | HEIGHT: 66 IN | DIASTOLIC BLOOD PRESSURE: 86 MMHG | WEIGHT: 207.01 LBS | HEART RATE: 76 BPM | BODY MASS INDEX: 33.27 KG/M2

## 2019-05-07 DIAGNOSIS — M25.562 ACUTE PAIN OF LEFT KNEE: Primary | ICD-10-CM

## 2019-05-07 PROCEDURE — 99203 OFFICE O/P NEW LOW 30 MIN: CPT | Performed by: ORTHOPAEDIC SURGERY

## 2019-05-07 RX ORDER — MELOXICAM 7.5 MG/1
7.5 TABLET ORAL DAILY
Qty: 14 TABLET | Refills: 0 | Status: SHIPPED | OUTPATIENT
Start: 2019-05-07 | End: 2020-03-03

## 2019-05-08 ENCOUNTER — EVALUATION (OUTPATIENT)
Dept: PHYSICAL THERAPY | Facility: CLINIC | Age: 49
End: 2019-05-08
Payer: COMMERCIAL

## 2019-05-08 DIAGNOSIS — M25.562 ACUTE PAIN OF LEFT KNEE: Primary | ICD-10-CM

## 2019-05-08 PROCEDURE — 97110 THERAPEUTIC EXERCISES: CPT | Performed by: PHYSICAL THERAPIST

## 2019-05-08 PROCEDURE — 97162 PT EVAL MOD COMPLEX 30 MIN: CPT | Performed by: PHYSICAL THERAPIST

## 2019-05-09 ENCOUNTER — OFFICE VISIT (OUTPATIENT)
Dept: PHYSICAL THERAPY | Facility: CLINIC | Age: 49
End: 2019-05-09
Payer: COMMERCIAL

## 2019-05-09 DIAGNOSIS — M25.562 ACUTE PAIN OF LEFT KNEE: Primary | ICD-10-CM

## 2019-05-09 PROCEDURE — 97112 NEUROMUSCULAR REEDUCATION: CPT

## 2019-05-09 PROCEDURE — 97140 MANUAL THERAPY 1/> REGIONS: CPT

## 2019-05-09 PROCEDURE — 97110 THERAPEUTIC EXERCISES: CPT

## 2019-05-15 ENCOUNTER — OFFICE VISIT (OUTPATIENT)
Dept: PHYSICAL THERAPY | Facility: CLINIC | Age: 49
End: 2019-05-15
Payer: COMMERCIAL

## 2019-05-15 DIAGNOSIS — M25.562 ACUTE PAIN OF LEFT KNEE: Primary | ICD-10-CM

## 2019-05-15 PROCEDURE — 97110 THERAPEUTIC EXERCISES: CPT

## 2019-05-15 PROCEDURE — 97112 NEUROMUSCULAR REEDUCATION: CPT

## 2019-05-15 PROCEDURE — 97140 MANUAL THERAPY 1/> REGIONS: CPT

## 2019-05-20 ENCOUNTER — OFFICE VISIT (OUTPATIENT)
Dept: PHYSICAL THERAPY | Facility: CLINIC | Age: 49
End: 2019-05-20
Payer: COMMERCIAL

## 2019-05-20 DIAGNOSIS — M25.562 ACUTE PAIN OF LEFT KNEE: Primary | ICD-10-CM

## 2019-05-20 PROCEDURE — 97140 MANUAL THERAPY 1/> REGIONS: CPT

## 2019-05-20 PROCEDURE — 97110 THERAPEUTIC EXERCISES: CPT

## 2019-05-20 PROCEDURE — 97112 NEUROMUSCULAR REEDUCATION: CPT

## 2019-05-22 ENCOUNTER — APPOINTMENT (OUTPATIENT)
Dept: PHYSICAL THERAPY | Facility: CLINIC | Age: 49
End: 2019-05-22
Payer: COMMERCIAL

## 2019-05-29 ENCOUNTER — APPOINTMENT (OUTPATIENT)
Dept: PHYSICAL THERAPY | Facility: CLINIC | Age: 49
End: 2019-05-29
Payer: COMMERCIAL

## 2019-06-03 ENCOUNTER — EVALUATION (OUTPATIENT)
Dept: PHYSICAL THERAPY | Facility: CLINIC | Age: 49
End: 2019-06-03
Payer: COMMERCIAL

## 2019-06-03 DIAGNOSIS — M25.562 ACUTE PAIN OF LEFT KNEE: Primary | ICD-10-CM

## 2019-06-03 PROCEDURE — 97140 MANUAL THERAPY 1/> REGIONS: CPT | Performed by: PHYSICAL THERAPIST

## 2019-06-03 PROCEDURE — 97112 NEUROMUSCULAR REEDUCATION: CPT | Performed by: PHYSICAL THERAPIST

## 2019-06-05 ENCOUNTER — APPOINTMENT (OUTPATIENT)
Dept: PHYSICAL THERAPY | Facility: CLINIC | Age: 49
End: 2019-06-05
Payer: COMMERCIAL

## 2019-06-10 ENCOUNTER — APPOINTMENT (OUTPATIENT)
Dept: PHYSICAL THERAPY | Facility: CLINIC | Age: 49
End: 2019-06-10
Payer: COMMERCIAL

## 2019-06-12 ENCOUNTER — APPOINTMENT (OUTPATIENT)
Dept: PHYSICAL THERAPY | Facility: CLINIC | Age: 49
End: 2019-06-12
Payer: COMMERCIAL

## 2019-06-13 ENCOUNTER — OFFICE VISIT (OUTPATIENT)
Dept: PHYSICAL THERAPY | Facility: CLINIC | Age: 49
End: 2019-06-13
Payer: COMMERCIAL

## 2019-06-13 DIAGNOSIS — M25.562 ACUTE PAIN OF LEFT KNEE: Primary | ICD-10-CM

## 2019-06-13 PROCEDURE — 97110 THERAPEUTIC EXERCISES: CPT

## 2019-06-13 PROCEDURE — 97112 NEUROMUSCULAR REEDUCATION: CPT

## 2019-06-13 PROCEDURE — 97140 MANUAL THERAPY 1/> REGIONS: CPT

## 2019-06-17 ENCOUNTER — APPOINTMENT (OUTPATIENT)
Dept: PHYSICAL THERAPY | Facility: CLINIC | Age: 49
End: 2019-06-17
Payer: COMMERCIAL

## 2019-06-19 ENCOUNTER — APPOINTMENT (OUTPATIENT)
Dept: PHYSICAL THERAPY | Facility: CLINIC | Age: 49
End: 2019-06-19
Payer: COMMERCIAL

## 2019-06-24 ENCOUNTER — OFFICE VISIT (OUTPATIENT)
Dept: OBGYN CLINIC | Facility: OTHER | Age: 49
End: 2019-06-24
Payer: COMMERCIAL

## 2019-06-24 VITALS
SYSTOLIC BLOOD PRESSURE: 129 MMHG | BODY MASS INDEX: 32.62 KG/M2 | WEIGHT: 203 LBS | HEIGHT: 66 IN | HEART RATE: 90 BPM | DIASTOLIC BLOOD PRESSURE: 86 MMHG

## 2019-06-24 DIAGNOSIS — M22.2X2 PATELLOFEMORAL PAIN SYNDROME OF LEFT KNEE: ICD-10-CM

## 2019-06-24 DIAGNOSIS — M25.551 PAIN IN RIGHT HIP: Primary | ICD-10-CM

## 2019-06-24 PROCEDURE — 99213 OFFICE O/P EST LOW 20 MIN: CPT | Performed by: ORTHOPAEDIC SURGERY

## 2020-03-03 ENCOUNTER — HOSPITAL ENCOUNTER (EMERGENCY)
Facility: HOSPITAL | Age: 50
Discharge: HOME | End: 2020-03-03
Attending: EMERGENCY MEDICINE
Payer: COMMERCIAL

## 2020-03-03 VITALS
SYSTOLIC BLOOD PRESSURE: 140 MMHG | HEART RATE: 74 BPM | OXYGEN SATURATION: 97 % | RESPIRATION RATE: 20 BRPM | WEIGHT: 200.6 LBS | DIASTOLIC BLOOD PRESSURE: 77 MMHG | TEMPERATURE: 97.4 F | BODY MASS INDEX: 32.38 KG/M2

## 2020-03-03 DIAGNOSIS — M62.838 TRAPEZIUS MUSCLE SPASM: Primary | ICD-10-CM

## 2020-03-03 PROCEDURE — 99283 EMERGENCY DEPT VISIT LOW MDM: CPT

## 2020-03-03 PROCEDURE — 99284 EMERGENCY DEPT VISIT MOD MDM: CPT | Performed by: EMERGENCY MEDICINE

## 2020-03-03 RX ORDER — CYCLOBENZAPRINE HCL 10 MG
10 TABLET ORAL ONCE
Status: COMPLETED | OUTPATIENT
Start: 2020-03-03 | End: 2020-03-03

## 2020-03-03 RX ORDER — CYCLOBENZAPRINE HCL 10 MG
10 TABLET ORAL 2 TIMES DAILY PRN
Qty: 10 TABLET | Refills: 0 | Status: SHIPPED | OUTPATIENT
Start: 2020-03-03 | End: 2020-06-26 | Stop reason: ALTCHOICE

## 2020-03-03 RX ORDER — PREDNISONE 20 MG/1
40 TABLET ORAL ONCE
Status: COMPLETED | OUTPATIENT
Start: 2020-03-03 | End: 2020-03-03

## 2020-03-03 RX ORDER — PREDNISONE 10 MG/1
20 TABLET ORAL DAILY
Qty: 10 TABLET | Refills: 0 | Status: SHIPPED | OUTPATIENT
Start: 2020-03-03 | End: 2020-03-09

## 2020-03-03 RX ADMIN — CYCLOBENZAPRINE HYDROCHLORIDE 10 MG: 10 TABLET, FILM COATED ORAL at 20:55

## 2020-03-03 RX ADMIN — PREDNISONE 40 MG: 20 TABLET ORAL at 20:54

## 2020-03-04 NOTE — ED PROVIDER NOTES
History  Chief Complaint   Patient presents with    Shoulder Pain     States pain right shoulder and neck for about 6 weeks  States no injury, has had same in past  States last took Advil several days ago      49 yo female with right shoulder and back pain over the last 6 weeks  No fevers or chills  No known injury  Pt is awake and alert  No alleviating factors  Pain increased by movement of the right trapezius muscle  No neuro defecits  noted  History provided by:  Patient   used: No        None       Past Medical History:   Diagnosis Date    History of transfusion 2017    given due to Hgb at 6 6, no rx     (spontaneous vaginal delivery)     x4       Past Surgical History:   Procedure Laterality Date    ND CYSTOURETHROSCOPY N/A 2018    Procedure: CYSTOSCOPY;  Surgeon: Callie Che MD;  [facility];  Service: Gynecology    ND LAPAROSCOPY W TOT HYSTERECTUTERUS <=250 [place]  W TUBE/OVARY N/A 2018    Procedure: TOTAL LAPAROSCOPIC HYSTERECTOMY; BILATERAL SALPINGECTOMY;  Surgeon: Callie Che MD;  [facility];  Service: Gynecology    ND TOTAL ABDOM HYSTERECTOMY N/A 2018    Procedure: TOTAL ABDOMINAL HYSTERECTOMY;  Surgeon: Callie Che MD;  [facility];  Service: Gynecology    SALPINGECTOMY Bilateral 2018    Procedure: SALPINGECTOMY;  Surgeon: Callie Che MD;  [facility];  Service: Gynecology    WISDOM TOOTH EXTRACTION         Family History   Problem Relation Age of Onset    Lung cancer Father      I have reviewed and agree with the history as documented  E-Cigarette/Vaping    E-Cigarette Use Never User      E-Cigarette/Vaping Substances     Social History     Tobacco Use    Smoking status: Former Smoker     Last attempt to quit:      Years since quittin 1    Smokeless tobacco: Never Used   Substance Use Topics    Alcohol use:  Yes     Alcohol/week: 2 0 standard drinks Types: 2 Glasses of wine per week     Comment: social    Drug use: No       Review of Systems   Constitutional: Negative for activity change, chills, diaphoresis and fever  HENT: Negative for congestion, ear pain, nosebleeds, sore throat, trouble swallowing and voice change  Eyes: Negative for pain, discharge and redness  Respiratory: Negative for apnea, cough, choking, shortness of breath, wheezing and stridor  Cardiovascular: Negative for chest pain and palpitations  Gastrointestinal: Negative for abdominal distention, abdominal pain, constipation, diarrhea, nausea and vomiting  Endocrine: Negative for polydipsia  Genitourinary: Negative for difficulty urinating, dysuria, flank pain, frequency, hematuria and urgency  Musculoskeletal: Positive for back pain and myalgias  Negative for gait problem, joint swelling, neck pain and neck stiffness  Skin: Negative for pallor and rash  Neurological: Negative for dizziness, tremors, syncope, speech difficulty, weakness, numbness and headaches  Hematological: Negative for adenopathy  Psychiatric/Behavioral: Negative for confusion, hallucinations, self-injury and suicidal ideas  The patient is not nervous/anxious  Physical Exam  Physical Exam   Constitutional: She is oriented to person, place, and time  Vital signs are normal  She appears well-developed and well-nourished  No distress  HENT:   Head: Normocephalic and atraumatic  Right Ear: External ear normal    Left Ear: External ear normal    Nose: Nose normal    Mouth/Throat: Oropharynx is clear and moist    Eyes: Pupils are equal, round, and reactive to light  Conjunctivae are normal    Neck: Normal range of motion  Neck supple  Cardiovascular: Normal rate, regular rhythm, normal heart sounds and intact distal pulses  Pulmonary/Chest: Effort normal and breath sounds normal    Abdominal: Soft  Bowel sounds are normal    Musculoskeletal: Normal range of motion   She exhibits tenderness  Tenderness in the distribution of the trapezius muscle  Neurological: She is alert and oriented to person, place, and time  She has normal reflexes  Skin: Skin is warm and dry  She is not diaphoretic  Psychiatric: She has a normal mood and affect  Nursing note and vitals reviewed  Vital Signs  ED Triage Vitals [03/03/20 2035]   Temperature Pulse Respirations Blood Pressure SpO2   (!) 97 4 °F (36 3 °C) 74 20 140/77 97 %      Temp Source Heart Rate Source Patient Position - Orthostatic VS BP Location FiO2 (%)   Tympanic Monitor Sitting Left arm --      Pain Score       7           Vitals:    03/03/20 2035   BP: 140/77   Pulse: 74   Patient Position - Orthostatic VS: Sitting         Visual Acuity      ED Medications  Medications   predniSONE tablet 40 mg (has no administration in time range)   cyclobenzaprine (FLEXERIL) tablet 10 mg (has no administration in time range)       Diagnostic Studies  Results Reviewed     None                 No orders to display              Procedures  Procedures         ED Course                               MDM      Disposition  Final diagnoses:   Trapezius muscle spasm     Time reflects when diagnosis was documented in both MDM as applicable and the Disposition within this note     Time User Action Codes Description Comment    3/3/2020  8:48 PM Marianne Ortiz Add [Z12 230] Trapezius muscle spasm       ED Disposition     ED Disposition Condition Date/Time Comment    Discharge Stable Tue Mar 3, 2020  8:48 PM Eloise Ryder discharge to home/self care              Follow-up Information     Follow up With Specialties Details Why Ene Swenson MD Family Medicine Schedule an appointment as soon as possible for a visit  As needed [address] [place] [id number]  [phone]            Patient's Medications   Discharge Prescriptions    CYCLOBENZAPRINE (FLEXERIL) 10 MG TABLET    Take 1 tablet (10 mg total) by mouth 2 (two) times a day as needed for muscle spasms       Start Date: 3/3/2020  End Date: --       Order Dose: 10 mg       Quantity: 10 tablet    Refills: 0    PREDNISONE 10 MG TABLET    Take 2 tablets (20 mg total) by mouth daily for 5 days       Start Date: 3/3/2020  End Date: 3/8/2020       Order Dose: 20 mg       Quantity: 10 tablet    Refills: 0     No discharge procedures on file      [address] Review     None          ED Provider  Electronically Signed by           Kofi Moody DO  03/03/20 2053       Kofi Moody DO  03/03/20 2054

## 2020-03-09 ENCOUNTER — APPOINTMENT (OUTPATIENT)
Dept: LAB | Facility: CLINIC | Age: 50
End: 2020-03-09
Payer: COMMERCIAL

## 2020-03-09 ENCOUNTER — OFFICE VISIT (OUTPATIENT)
Dept: FAMILY MEDICINE CLINIC | Facility: CLINIC | Age: 50
End: 2020-03-09
Payer: COMMERCIAL

## 2020-03-09 VITALS
HEIGHT: 66 IN | HEART RATE: 68 BPM | WEIGHT: 198 LBS | SYSTOLIC BLOOD PRESSURE: 130 MMHG | BODY MASS INDEX: 31.82 KG/M2 | OXYGEN SATURATION: 98 % | DIASTOLIC BLOOD PRESSURE: 80 MMHG

## 2020-03-09 DIAGNOSIS — Z12.31 VISIT FOR SCREENING MAMMOGRAM: ICD-10-CM

## 2020-03-09 DIAGNOSIS — M54.2 NECK PAIN: Primary | ICD-10-CM

## 2020-03-09 DIAGNOSIS — Z12.11 SCREEN FOR COLON CANCER: ICD-10-CM

## 2020-03-09 LAB
ALBUMIN SERPL BCP-MCNC: 4.2 G/DL (ref 3.5–5)
ALP SERPL-CCNC: 100 U/L (ref 46–116)
ALT SERPL W P-5'-P-CCNC: 75 U/L (ref 12–78)
ANION GAP SERPL CALCULATED.3IONS-SCNC: 8 MMOL/L (ref 4–13)
AST SERPL W P-5'-P-CCNC: 25 U/L (ref 5–45)
BASOPHILS # BLD AUTO: 0.05 THOUSANDS/ΜL (ref 0–0.1)
BASOPHILS NFR BLD AUTO: 1 % (ref 0–1)
BILIRUB SERPL-MCNC: 0.48 MG/DL (ref 0.2–1)
BUN SERPL-MCNC: 11 MG/DL (ref 5–25)
CALCIUM SERPL-MCNC: 10 MG/DL (ref 8.3–10.1)
CHLORIDE SERPL-SCNC: 109 MMOL/L (ref 100–108)
CHOLEST SERPL-MCNC: 204 MG/DL (ref 50–200)
CO2 SERPL-SCNC: 28 MMOL/L (ref 21–32)
CREAT SERPL-MCNC: 0.87 MG/DL (ref 0.6–1.3)
EOSINOPHIL # BLD AUTO: 0.06 THOUSAND/ΜL (ref 0–0.61)
EOSINOPHIL NFR BLD AUTO: 1 % (ref 0–6)
ERYTHROCYTE [DISTWIDTH] IN BLOOD BY AUTOMATED COUNT: 12.5 % (ref 11.6–15.1)
EST. AVERAGE GLUCOSE BLD GHB EST-MCNC: 140 MG/DL
GFR SERPL CREATININE-BSD FRML MDRD: 90 ML/MIN/1.73SQ M
GLUCOSE P FAST SERPL-MCNC: 96 MG/DL (ref 65–99)
HBA1C MFR BLD: 6.5 %
HCT VFR BLD AUTO: 46.4 % (ref 34.8–46.1)
HDLC SERPL-MCNC: 55 MG/DL
HGB BLD-MCNC: 14.3 G/DL (ref 11.5–15.4)
IMM GRANULOCYTES # BLD AUTO: 0.03 THOUSAND/UL (ref 0–0.2)
IMM GRANULOCYTES NFR BLD AUTO: 0 % (ref 0–2)
LDLC SERPL CALC-MCNC: 136 MG/DL (ref 0–100)
LYMPHOCYTES # BLD AUTO: 2.59 THOUSANDS/ΜL (ref 0.6–4.47)
LYMPHOCYTES NFR BLD AUTO: 25 % (ref 14–44)
MCH RBC QN AUTO: 29.7 PG (ref 26.8–34.3)
MCHC RBC AUTO-ENTMCNC: 30.8 G/DL (ref 31.4–37.4)
MCV RBC AUTO: 97 FL (ref 82–98)
MONOCYTES # BLD AUTO: 0.87 THOUSAND/ΜL (ref 0.17–1.22)
MONOCYTES NFR BLD AUTO: 9 % (ref 4–12)
NEUTROPHILS # BLD AUTO: 6.63 THOUSANDS/ΜL (ref 1.85–7.62)
NEUTS SEG NFR BLD AUTO: 64 % (ref 43–75)
NONHDLC SERPL-MCNC: 149 MG/DL
NRBC BLD AUTO-RTO: 0 /100 WBCS
PLATELET # BLD AUTO: 315 THOUSANDS/UL (ref 149–390)
PMV BLD AUTO: 10 FL (ref 8.9–12.7)
POTASSIUM SERPL-SCNC: 4.4 MMOL/L (ref 3.5–5.3)
PROT SERPL-MCNC: 8.3 G/DL (ref 6.4–8.2)
RBC # BLD AUTO: 4.81 MILLION/UL (ref 3.81–5.12)
SODIUM SERPL-SCNC: 145 MMOL/L (ref 136–145)
TRIGL SERPL-MCNC: 66 MG/DL
TSH SERPL DL<=0.05 MIU/L-ACNC: 1.47 UIU/ML (ref 0.36–3.74)
WBC # BLD AUTO: 10.23 THOUSAND/UL (ref 4.31–10.16)

## 2020-03-09 PROCEDURE — 84443 ASSAY THYROID STIM HORMONE: CPT | Performed by: FAMILY MEDICINE

## 2020-03-09 PROCEDURE — 83036 HEMOGLOBIN GLYCOSYLATED A1C: CPT | Performed by: FAMILY MEDICINE

## 2020-03-09 PROCEDURE — 99214 OFFICE O/P EST MOD 30 MIN: CPT | Performed by: FAMILY MEDICINE

## 2020-03-09 PROCEDURE — 80053 COMPREHEN METABOLIC PANEL: CPT | Performed by: FAMILY MEDICINE

## 2020-03-09 PROCEDURE — 1036F TOBACCO NON-USER: CPT | Performed by: FAMILY MEDICINE

## 2020-03-09 PROCEDURE — 80061 LIPID PANEL: CPT | Performed by: FAMILY MEDICINE

## 2020-03-09 PROCEDURE — 36415 COLL VENOUS BLD VENIPUNCTURE: CPT | Performed by: FAMILY MEDICINE

## 2020-03-09 PROCEDURE — 85025 COMPLETE CBC W/AUTO DIFF WBC: CPT | Performed by: FAMILY MEDICINE

## 2020-03-09 PROCEDURE — 3008F BODY MASS INDEX DOCD: CPT | Performed by: FAMILY MEDICINE

## 2020-03-09 NOTE — ASSESSMENT & PLAN NOTE
Muscle spasm of the neck, improved mildly with oral prednisone  Since symptoms are not radiating to the upper back patient advised warm compresses, gentle range of motion  Suggested to start physical therapy since there are no other red flags on history or exam today

## 2020-03-09 NOTE — PROGRESS NOTES
Subjective:      Patient ID: Jennifer Bautista is a 48 y o  female  Neck Pain    This is a new problem  The current episode started in the past 7 days  The problem occurs daily  The problem has been gradually improving  The pain is present in the right side  The quality of the pain is described as aching  The pain is at a severity of 5/10  The pain is moderate  The symptoms are aggravated by twisting  The pain is same all the time  Stiffness is present all day  Pertinent negatives include no chest pain, headaches, numbness, tingling, trouble swallowing, visual change or weakness  Treatments tried: Oral prednisone  The treatment provided moderate relief  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 that probably worsened her symptoms  She is not experiencing tightness in her upper back  Patient did not take the muscle relaxer as prescribed at the ER since she was worried about the side effects of drowsiness  The patient states that she would like to try physical therapy  Is requesting an excuse from work for 1 week  States that she was provided a work note when she was in the ER, however since it is a busy time of the year she was still working from home  Her symptoms have improved mildly however not resolved yet  Patient has been advised in the past to get basic labs to follow-up on chronic medical issues  Patient has not had labs yet  Is also due for preventative screening including mammogram and colonoscopy      Past Medical History:   Diagnosis Date    History of transfusion 2017    given due to Hgb at 6 6, no rx     (spontaneous vaginal delivery)     x4       Family History   Problem Relation Age of Onset    Lung cancer Father        Past Surgical History:   Procedure Laterality Date    CA CYSTOURETHROSCOPY N/A 2018    Procedure: CYSTOSCOPY;  Surgeon: Ozzy Coulter MD;  [facility];  Service: Gynecology    CA LAPAROSCOPY W TOT HYSTERECTUTERUS <=250 [place] TUBE/OVARY N/A 1/8/2018    Procedure: TOTAL LAPAROSCOPIC HYSTERECTOMY; BILATERAL SALPINGECTOMY;  Surgeon: Angel Olivier MD;  [facility];  Service: Gynecology    [place] TOTAL ABDOM HYSTERECTOMY N/A 1/8/2018    Procedure: TOTAL ABDOMINAL HYSTERECTOMY;  Surgeon: Angel Olivier MD;  [facility];  Service: Gynecology    SALPINGECTOMY Bilateral 1/8/2018    Procedure: SALPINGECTOMY;  Surgeon: Angel Olivier MD;  [facility];  Service: Gynecology    WISDOM TOOTH EXTRACTION          reports that she quit smoking about 12 years ago  She has never used smokeless tobacco  She reports that she drinks about 2 0 standard drinks of alcohol per week  She reports that she does not use drugs  Current Outpatient Medications:     cyclobenzaprine (FLEXERIL) 10 mg tablet, Take 1 tablet (10 mg total) by mouth 2 (two) times a day as needed for muscle spasms (Patient not taking: Reported on 3/9/2020), Disp: 10 tablet, Rfl: 0    The following portions of the patient's history were reviewed and updated as appropriate: allergies, current medications, past family history, past medical history, past social history, past surgical history and problem list     Review of Systems   Constitutional: Negative  HENT: Negative for trouble swallowing  Eyes: Negative  Respiratory: Negative  Negative for shortness of breath  Cardiovascular: Negative  Negative for chest pain and palpitations  Gastrointestinal: Negative  Endocrine: Negative  Genitourinary: Negative  Musculoskeletal: Positive for neck pain  Negative for myalgias  Neurological: Negative for tingling, weakness, numbness and headaches  Psychiatric/Behavioral: Negative              Objective:    /80 (BP Location: Left arm, Patient Position: Sitting, Cuff Size: Large)   Pulse 68   Ht 5' 6" (1 676 m)   Wt 89 8 kg (198 lb)   LMP 11/08/2017   SpO2 98%   BMI 31 96 kg/m²      Physical Exam   Constitutional: She is oriented to person, place, and time  She appears well-developed and well-nourished  HENT:   Mouth/Throat: Oropharynx is clear and moist    Eyes: Pupils are equal, round, and reactive to light  Neck: Normal range of motion  Cardiovascular: Normal rate, regular rhythm and normal heart sounds  Pulmonary/Chest: Effort normal and breath sounds normal    Abdominal: Soft  Bowel sounds are normal    Musculoskeletal: Normal range of motion  Neurological: She is alert and oriented to person, place, and time  Psychiatric: Her behavior is normal  Judgment normal          No results found for this or any previous visit (from the past 1008 hour(s))  Assessment/Plan:    No problem-specific Assessment & Plan notes found for this encounter  Problem List Items Addressed This Visit        Other    Screen for colon cancer    Relevant Orders    Cologuard    Neck pain - Primary     Muscle spasm of the neck, improved mildly with oral prednisone  Since symptoms are not radiating to the upper back patient advised warm compresses, gentle range of motion  Suggested to start physical therapy since there are no other red flags on history or exam today  Relevant Orders    Ambulatory referral to Physical Therapy        patient encouraged to go for labs to follow-up on other chronic issues  Will be contacted with the results

## 2020-03-12 ENCOUNTER — OFFICE VISIT (OUTPATIENT)
Dept: FAMILY MEDICINE CLINIC | Facility: CLINIC | Age: 50
End: 2020-03-12
Payer: COMMERCIAL

## 2020-03-12 VITALS
DIASTOLIC BLOOD PRESSURE: 78 MMHG | HEIGHT: 66 IN | RESPIRATION RATE: 18 BRPM | SYSTOLIC BLOOD PRESSURE: 128 MMHG | OXYGEN SATURATION: 97 % | BODY MASS INDEX: 32.78 KG/M2 | TEMPERATURE: 97.6 F | HEART RATE: 89 BPM | WEIGHT: 204 LBS

## 2020-03-12 DIAGNOSIS — E11.9 CONTROLLED TYPE 2 DIABETES MELLITUS WITHOUT COMPLICATION, WITHOUT LONG-TERM CURRENT USE OF INSULIN (HCC): Primary | ICD-10-CM

## 2020-03-12 DIAGNOSIS — E78.5 HYPERLIPIDEMIA, UNSPECIFIED HYPERLIPIDEMIA TYPE: ICD-10-CM

## 2020-03-12 PROCEDURE — 3044F HG A1C LEVEL LT 7.0%: CPT | Performed by: FAMILY MEDICINE

## 2020-03-12 PROCEDURE — 3008F BODY MASS INDEX DOCD: CPT | Performed by: FAMILY MEDICINE

## 2020-03-12 PROCEDURE — 1036F TOBACCO NON-USER: CPT | Performed by: FAMILY MEDICINE

## 2020-03-12 PROCEDURE — 99214 OFFICE O/P EST MOD 30 MIN: CPT | Performed by: FAMILY MEDICINE

## 2020-03-12 NOTE — ASSESSMENT & PLAN NOTE
Patient's cholesterol is elevated  Different cholesterol-lowering options discussed with patient  Patient does not want to start any medications yet  Suggested low-fat diet and regular exercise  Will recheck lipid panel at 3 month interval and follow up thereafter

## 2020-03-12 NOTE — ASSESSMENT & PLAN NOTE
Lab Results   Component Value Date    HGBA1C 6 5 (H) 03/09/2020    new onset diabetes  Treatment options discussed with patient  Currently would like to start with diet modifications    Will recheck A1c at 3 months interval and follow up

## 2020-03-12 NOTE — PROGRESS NOTES
Subjective:      Patient ID: Polo Jeans is a 48 y o  female  Diabetes   She presents for her initial diabetic visit  She has type 2 diabetes mellitus  Disease course: A1c 6 5  There are no hypoglycemic associated symptoms  Pertinent negatives for diabetes include no chest pain, no fatigue, no foot paresthesias, no polydipsia, no polyphagia and no polyuria  There are no hypoglycemic complications  There are no diabetic complications  Risk factors for coronary artery disease include diabetes mellitus, dyslipidemia, post-menopausal and obesity  When asked about current treatments, none were reported  She is following a generally healthy diet  When asked about meal planning, she reported none  An ACE inhibitor/angiotensin II receptor blocker is not being taken  She does not see a podiatrist Eye exam is not current  Hyperlipidemia   This is a new problem  This is a new diagnosis  The problem is uncontrolled  Recent lipid tests were reviewed and are high (ldl 136)  Pertinent negatives include no chest pain, myalgias or shortness of breath  She is currently on no antihyperlipidemic treatment  There are no compliance problems    Risk factors for coronary artery disease include dyslipidemia, diabetes mellitus, obesity and post-menopausal        Past Medical History:   Diagnosis Date    History of transfusion 2017    given due to Hgb at 6 6, no rx     (spontaneous vaginal delivery)     x4       Family History   Problem Relation Age of Onset    Lung cancer Father        Past Surgical History:   Procedure Laterality Date    AL CYSTOURETHROSCOPY N/A 2018    Procedure: CYSTOSCOPY;  Surgeon: Breana De MD;  [facility];  Service: Gynecology    AL LAPAROSCOPY W TOT HYSTERECTUTERUS <=250 GRAM  W TUBE/OVARY N/A 2018    Procedure: TOTAL LAPAROSCOPIC HYSTERECTOMY; BILATERAL SALPINGECTOMY;  Surgeon: Breana De MD;  [facility];  Service: Gynecology    AL TOTAL ABDOM HYSTERECTOMY N/A 1/8/2018    Procedure: TOTAL ABDOMINAL HYSTERECTOMY;  Surgeon: Malathi Sands MD;  [facility];  Service: Gynecology    SALPINGECTOMY Bilateral 1/8/2018    Procedure: SALPINGECTOMY;  Surgeon: Malathi Sands MD;  [facility];  Service: Gynecology    WISDOM TOOTH EXTRACTION          reports that she quit smoking about 12 years ago  She has never used smokeless tobacco  She reports that she drinks about 2 0 standard drinks of alcohol per week  She reports that she does not use drugs  Current Outpatient Medications:     cyclobenzaprine (FLEXERIL) 10 mg tablet, Take 1 tablet (10 mg total) by mouth 2 (two) times a day as needed for muscle spasms, Disp: 10 tablet, Rfl: 0    The following portions of the patient's history were reviewed and updated as appropriate: allergies, current medications, past family history, past medical history, past social history, past surgical history and problem list     Review of Systems   Constitutional: Negative  Negative for fatigue  Respiratory: Negative  Negative for shortness of breath  Cardiovascular: Negative  Negative for chest pain and palpitations  Endocrine: Negative for polydipsia, polyphagia and polyuria  Musculoskeletal: Negative for myalgias  Neurological: Negative  Psychiatric/Behavioral: Negative  Objective:    /78 (BP Location: Left arm, Patient Position: Sitting, Cuff Size: Large)   Pulse 89   Temp 97 6 °F (36 4 °C)   Resp 18   Ht 5' 6" (1 676 m)   Wt 92 5 kg (204 lb)   LMP 11/08/2017   SpO2 97%   BMI 32 93 kg/m²      Physical Exam   Constitutional: She is oriented to person, place, and time  She appears well-developed and well-nourished  HENT:   Mouth/Throat: Oropharynx is clear and moist    Eyes: Pupils are equal, round, and reactive to light  Neck: Normal range of motion  Cardiovascular: Normal rate, regular rhythm and normal heart sounds  Pulses are no weak pulses  Pulmonary/Chest: Effort normal and breath sounds normal    Abdominal: Soft  Bowel sounds are normal    Musculoskeletal: Normal range of motion  Feet:   Right Foot:   Skin Integrity: Negative for ulcer, skin breakdown, erythema, warmth, callus or dry skin  Left Foot:   Skin Integrity: Negative for ulcer, skin breakdown, erythema, warmth, callus or dry skin  Neurological: She is alert and oriented to person, place, and time  Psychiatric: Her behavior is normal  Judgment normal        Patient's shoes and socks removed  Right Foot/Ankle   Right Foot Inspection  Skin Exam: skin normal and skin intact no dry skin, no warmth, no callus, no erythema, no maceration, no abnormal color, no pre-ulcer, no ulcer and no callus                          Toe Exam: ROM and strength within normal limits  Sensory   Vibration: intact  Proprioception: intact   Monofilament testing: intact  Vascular  Capillary refills: < 3 seconds      Left Foot/Ankle  Left Foot Inspection  Skin Exam: skin normal and skin intactno dry skin, no warmth, no erythema, no maceration, normal color, no pre-ulcer, no ulcer and no callus                         Toe Exam: ROM and strength within normal limits                   Sensory   Vibration: intact  Proprioception: intact  Monofilament: intact  Vascular  Capillary refills: < 3 seconds    Assign Risk Category:  No deformity present; No loss of protective sensation;  No weak pulses       Risk: 0    Recent Results (from the past 1008 hour(s))   CBC and differential    Collection Time: 03/09/20 12:33 PM   Result Value Ref Range    WBC 10 23 (H) 4 31 - 10 16 Thousand/uL    RBC 4 81 3 81 - 5 12 Million/uL    Hemoglobin 14 3 11 5 - 15 4 g/dL    Hematocrit 46 4 (H) 34 8 - 46 1 %    MCV 97 82 - 98 fL    MCH 29 7 26 8 - 34 3 pg    MCHC 30 8 (L) 31 4 - 37 4 g/dL    RDW 12 5 11 6 - 15 1 %    MPV 10 0 8 9 - 12 7 fL    Platelets 211 555 - 163 Thousands/uL    nRBC 0 /100 WBCs    Neutrophils Relative 64 43 - 75 % Immat GRANS % 0 0 - 2 %    Lymphocytes Relative 25 14 - 44 %    Monocytes Relative 9 4 - 12 %    Eosinophils Relative 1 0 - 6 %    Basophils Relative 1 0 - 1 %    Neutrophils Absolute 6 63 1 85 - 7 62 Thousands/µL    Immature Grans Absolute 0 03 0 00 - 0 20 Thousand/uL    Lymphocytes Absolute 2 59 0 60 - 4 47 Thousands/µL    Monocytes Absolute 0 87 0 17 - 1 22 Thousand/µL    Eosinophils Absolute 0 06 0 00 - 0 61 Thousand/µL    Basophils Absolute 0 05 0 00 - 0 10 Thousands/µL   Comprehensive metabolic panel    Collection Time: 03/09/20 12:33 PM   Result Value Ref Range    Sodium 145 136 - 145 mmol/L    Potassium 4 4 3 5 - 5 3 mmol/L    Chloride 109 (H) 100 - 108 mmol/L    CO2 28 21 - 32 mmol/L    ANION GAP 8 4 - 13 mmol/L    BUN 11 5 - 25 mg/dL    Creatinine 0 87 0 60 - 1 30 mg/dL    Glucose, Fasting 96 65 - 99 mg/dL    Calcium 10 0 8 3 - 10 1 mg/dL    AST 25 5 - 45 U/L    ALT 75 12 - 78 U/L    Alkaline Phosphatase 100 46 - 116 U/L    Total Protein 8 3 (H) 6 4 - 8 2 g/dL    Albumin 4 2 3 5 - 5 0 g/dL    Total Bilirubin 0 48 0 20 - 1 00 mg/dL    eGFR 90 ml/min/1 73sq m   Hemoglobin A1C    Collection Time: 03/09/20 12:33 PM   Result Value Ref Range    Hemoglobin A1C 6 5 (H) Normal 3 8-5 6%; PreDiabetic 5 7-6 4%;  Diabetic >=6 5%; Glycemic control for adults with diabetes <7 0% %     mg/dl   Lipid panel    Collection Time: 03/09/20 12:33 PM   Result Value Ref Range    Cholesterol 204 (H) 50 - 200 mg/dL    Triglycerides 66 <=150 mg/dL    HDL, Direct 55 >=40 mg/dL    LDL Calculated 136 (H) 0 - 100 mg/dL    Non-HDL-Chol (CHOL-HDL) 149 mg/dl   TSH, 3rd generation with Free T4 reflex    Collection Time: 03/09/20 12:33 PM   Result Value Ref Range    TSH 3RD GENERATON 1 470 0 358 - 3 740 uIU/mL       Assessment/Plan:       Problem List Items Addressed This Visit        Endocrine    Controlled type 2 diabetes mellitus without complication, without long-term current use of insulin ([facility] [address] ) - Primary       Lab Results Component Value Date    HGBA1C 6 5 (H) 03/09/2020    new onset diabetes  Treatment options discussed with patient  Currently would like to start with diet modifications  Will recheck A1c at 3 months interval and follow up         Relevant Orders    Hemoglobin A1C    Microalbumin / creatinine urine ratio       Other    Hyperlipidemia     Patient's cholesterol is elevated  Different cholesterol-lowering options discussed with patient  Patient does not want to start any medications yet  Suggested low-fat diet and regular exercise  Will recheck lipid panel at 3 month interval and follow up thereafter               Relevant Orders    Lipid panel        patient encouraged to go for eye exam

## 2020-06-26 RX ORDER — OMEGA-3 FATTY ACIDS/FISH OIL 300-1000MG
200 CAPSULE ORAL EVERY 6 HOURS
COMMUNITY
Start: 2020-04-18 | End: 2020-07-09

## 2020-06-26 RX ORDER — AMOXICILLIN 500 MG/1
CAPSULE ORAL
COMMUNITY
Start: 2020-04-18 | End: 2020-06-26 | Stop reason: ALTCHOICE

## 2020-06-26 RX ORDER — CHLORHEXIDINE GLUCONATE 0.12 MG/ML
RINSE ORAL
COMMUNITY
Start: 2020-04-18 | End: 2020-06-26 | Stop reason: ALTCHOICE

## 2020-07-09 ENCOUNTER — TELEMEDICINE (OUTPATIENT)
Dept: FAMILY MEDICINE CLINIC | Facility: CLINIC | Age: 50
End: 2020-07-09
Payer: COMMERCIAL

## 2020-07-09 VITALS — WEIGHT: 204 LBS | HEIGHT: 66 IN | BODY MASS INDEX: 32.78 KG/M2

## 2020-07-09 DIAGNOSIS — E11.9 CONTROLLED TYPE 2 DIABETES MELLITUS WITHOUT COMPLICATION, WITHOUT LONG-TERM CURRENT USE OF INSULIN (HCC): Primary | ICD-10-CM

## 2020-07-09 DIAGNOSIS — E78.5 HYPERLIPIDEMIA, UNSPECIFIED HYPERLIPIDEMIA TYPE: ICD-10-CM

## 2020-07-09 PROCEDURE — 1036F TOBACCO NON-USER: CPT | Performed by: FAMILY MEDICINE

## 2020-07-09 PROCEDURE — 3008F BODY MASS INDEX DOCD: CPT | Performed by: FAMILY MEDICINE

## 2020-07-09 PROCEDURE — 99213 OFFICE O/P EST LOW 20 MIN: CPT | Performed by: FAMILY MEDICINE

## 2020-07-09 PROCEDURE — 3044F HG A1C LEVEL LT 7.0%: CPT | Performed by: FAMILY MEDICINE

## 2020-07-09 NOTE — ASSESSMENT & PLAN NOTE
Lab Results   Component Value Date    HGBA1C 6 5 (H) 03/09/2020     Patient encouraged to go for labs to follow-up on her new onset diabetes  Encouraged healthy lifestyle, low-calorie diet and regular exercise

## 2020-07-09 NOTE — ASSESSMENT & PLAN NOTE
Patient encouraged to go for metabolic labs to follow-up on lipid panel  Patient wants to wait a few more weeks due to [place] pandemic  Will follow-up after 2 months

## 2020-07-09 NOTE — PROGRESS NOTES
Virtual Regular Visit      Assessment/Plan:    Problem List Items Addressed This Visit        Endocrine    Controlled type 2 diabetes mellitus without complication, without long-term current use of insulin ([facility] [address] ) - Primary       Lab Results   Component Value Date    HGBA1C 6 5 (H) 03/09/2020     Patient encouraged to go for labs to follow-up on her new onset diabetes  Encouraged healthy lifestyle, low-calorie diet and regular exercise  Other    BMI 32 0-32 9,adult     BMI Counseling: Body mass index is 32 93 kg/m²  The BMI is above normal  Nutrition recommendations include reducing portion sizes, decreasing overall calorie intake, 3-5 servings of fruits/vegetables daily and reducing fast food intake  Exercise recommendations include moderate aerobic physical activity for 150 minutes/week  Hyperlipidemia     Patient encouraged to go for metabolic labs to follow-up on lipid panel  Patient wants to wait a few more weeks due to [place] pandemic  Will follow-up after 2 months  Reason for visit is   Chief Complaint   Patient presents with    Follow-up     did not go for labs    Virtual Regular Visit        Encounter provider Leo Guevara MD    Provider located at [address] [place] [phone]      Recent Visits  No visits were found meeting these conditions  Showing recent visits within past 7 days and meeting all other requirements     Today's Visits  Date Type Provider Dept   07/09/20 Telemedicine Leo Guevara MD [facility]   Showing today's visits and meeting all other requirements     Future Appointments  No visits were found meeting these conditions  Showing future appointments within next 150 days and meeting all other requirements        The patient was identified by name and date of birth   Aixamehrdad Akila was informed that this is a telemedicine visit and that the visit is being conducted through [employer] and patient was informed that this is a secure, HIPAA-compliant platform  She agrees to proceed     My office door was closed  No one else was in the room  She acknowledged consent and understanding of privacy and security of the video platform  The patient has agreed to participate and understands they can discontinue the visit at any time  Patient is aware this is a billable service  Kajal Abreu is a 48 y o  female    HPI   Patient is here for routine follow-up  Developed new onset diabetes with last A1c 6 5, elevated LDL  Patient was advised metabolic labs which she was unable to do due to [place] pandemic  In the meantime patient has made significant changes to her lifestyle  Is trying to eat healthy and exercise  Has not lost any weight yet  Patient plans to go for labs in a few months  Denies any symptoms of polyuria/polydipsia or chest pain  Past Medical History:   Diagnosis Date    History of transfusion 2017    given due to Hgb at 6 6, no rx     (spontaneous vaginal delivery)     x4       Past Surgical History:   Procedure Laterality Date    MS CYSTOURETHROSCOPY N/A 2018    Procedure: CYSTOSCOPY;  Surgeon: Sobeida Garzon MD;  [facility];  Service: Gynecology    MS LAPAROSCOPY W TOT HYSTERECTUTERUS <=250 [address]ty  W TUBE/OVARY N/A 2018    Procedure: TOTAL LAPAROSCOPIC HYSTERECTOMY; BILATERAL SALPINGECTOMY;  Surgeon: Sobeida Garzon MD;  [facility];  Service: Gynecology    MS TOTAL ABDOM HYSTERECTOMY N/A 2018    Procedure: TOTAL ABDOMINAL HYSTERECTOMY;  Surgeon: Sobeida Garzon MD;  [facility];  Service: Gynecology    SALPINGECTOMY Bilateral 2018    Procedure: SALPINGECTOMY;  Surgeon: Sobeida Garzon MD;  [facility];  Service: Gynecology    WISDOM TOOTH EXTRACTION         No current outpatient medications on file  No current facility-administered medications for this visit  No Known Allergies    Review of Systems   Constitutional: Negative  Respiratory: Negative  Negative for shortness of breath  Cardiovascular: Negative  Negative for chest pain and palpitations  Musculoskeletal: Negative for myalgias  Neurological: Negative  Psychiatric/Behavioral: Negative  Video Exam    Vitals:    07/09/20 0905   Weight: 92 5 kg (204 lb)   Height: 5' 6" (1 676 m)       Physical Exam   Constitutional: She is oriented to person, place, and time  She appears well-developed and well-nourished  Pulmonary/Chest: Breath sounds normal  No respiratory distress  She has no wheezes  Neurological: She is alert and oriented to person, place, and time  Psychiatric: She has a normal mood and affect  Her behavior is normal  Judgment and thought content normal         I spent 15 minutes with patient today in which greater than 50% of the time was spent in counseling/coordination of care regarding Management of symptoms, lifestyle changes  Encouraged to go for preventative screening  VIRTUAL VISIT DISCLAIMER    Adan Parkinson acknowledges that she has consented to an online visit or consultation  She understands that the online visit is based solely on information provided by her, and that, in the absence of a face-to-face physical evaluation by the physician, the diagnosis she receives is both limited and provisional in terms of accuracy and completeness  This is not intended to replace a full medical face-to-face evaluation by the physician  Adan Parkinson understands and accepts these terms

## 2020-09-23 ENCOUNTER — TELEMEDICINE (OUTPATIENT)
Dept: FAMILY MEDICINE CLINIC | Facility: CLINIC | Age: 50
End: 2020-09-23
Payer: COMMERCIAL

## 2020-09-23 VITALS
BODY MASS INDEX: 31.5 KG/M2 | WEIGHT: 196 LBS | SYSTOLIC BLOOD PRESSURE: 120 MMHG | DIASTOLIC BLOOD PRESSURE: 80 MMHG | HEIGHT: 66 IN

## 2020-09-23 DIAGNOSIS — E11.9 CONTROLLED TYPE 2 DIABETES MELLITUS WITHOUT COMPLICATION, WITHOUT LONG-TERM CURRENT USE OF INSULIN (HCC): Primary | ICD-10-CM

## 2020-09-23 DIAGNOSIS — E78.5 HYPERLIPIDEMIA, UNSPECIFIED HYPERLIPIDEMIA TYPE: ICD-10-CM

## 2020-09-23 PROBLEM — M25.562 ACUTE PAIN OF LEFT KNEE: Status: RESOLVED | Noted: 2019-05-06 | Resolved: 2020-09-23

## 2020-09-23 PROBLEM — R73.01 ELEVATED FASTING BLOOD SUGAR: Status: RESOLVED | Noted: 2019-04-18 | Resolved: 2020-09-23

## 2020-09-23 PROBLEM — M54.2 NECK PAIN: Status: RESOLVED | Noted: 2020-03-09 | Resolved: 2020-09-23

## 2020-09-23 PROBLEM — M22.2X2 PATELLOFEMORAL PAIN SYNDROME OF LEFT KNEE: Status: RESOLVED | Noted: 2019-06-24 | Resolved: 2020-09-23

## 2020-09-23 PROBLEM — M79.604 RIGHT LEG PAIN: Status: RESOLVED | Noted: 2018-03-16 | Resolved: 2020-09-23

## 2020-09-23 PROCEDURE — 99213 OFFICE O/P EST LOW 20 MIN: CPT | Performed by: FAMILY MEDICINE

## 2020-09-23 NOTE — PROGRESS NOTES
Virtual Regular Visit      Assessment/Plan:    Problem List Items Addressed This Visit        Endocrine    Controlled type 2 diabetes mellitus without complication, without long-term current use of insulin ([facility] [address] ) - Primary       Lab Results   Component Value Date    HGBA1C 6 5 (H) 03/09/2020     Patient lost at least 10 pounds by making changes to her lifestyle  Due for metabolic labs  Encouraged to go for labs  Will be contacted with the results  Other    BMI 32 0-32 9,adult     BMI Counseling: Body mass index is 31 64 kg/m²  The BMI is above normal  Nutrition recommendations include reducing portion sizes, decreasing overall calorie intake, 3-5 servings of fruits/vegetables daily, reducing fast food intake, consuming healthier snacks and decreasing soda and/or juice intake  Exercise recommendations include moderate aerobic physical activity for 150 minutes/week  Hyperlipidemia     Patient encouraged to go for metabolic labs  Will be contacted with results  Reason for visit is   Chief Complaint   Patient presents with    Follow-up    Virtual Regular Visit        Encounter provider Alejandra Sheppard MD    Provider located at [address] [place] [phone]      Recent Visits  No visits were found meeting these conditions  Showing recent visits within past 7 days and meeting all other requirements     Today's Visits  Date Type Provider Dept   09/23/20 Telemedicine Alejandra Sheppard MD [facility]   Showing today's visits and meeting all other requirements     Future Appointments  No visits were found meeting these conditions  Showing future appointments within next 150 days and meeting all other requirements        The patient was identified by name and date of birth   Celine Reina was informed that this is a telemedicine visit and that the visit is being conducted through [facility] and patient was informed that this is a secure, HIPAA-compliant platform  She agrees to proceed     My office door was closed  No one else was in the room  She acknowledged consent and understanding of privacy and security of the video platform  The patient has agreed to participate and understands they can discontinue the visit at any time  Patient is aware this is a billable service  Kajal Urrutia is a 48 y o  female    Diabetes   She presents for her follow-up diabetic visit  She has type 2 diabetes mellitus  Disease course: Due for labs  Last A1c 6 5  There are no hypoglycemic associated symptoms  Pertinent negatives for hypoglycemia include no headaches  There are no diabetic associated symptoms  Pertinent negatives for diabetes include no chest pain, no polydipsia, no polyphagia and no polyuria  There are no hypoglycemic complications  Symptoms are stable  There are no diabetic complications  Risk factors for coronary artery disease include diabetes mellitus, dyslipidemia and post-menopausal  Current diabetic treatment includes diet  She is compliant with treatment all of the time  Her weight is decreasing steadily  She is following a generally healthy diet  Meal planning includes avoidance of concentrated sweets  She participates in exercise daily  Hyperlipidemia   This is a chronic problem  The current episode started more than 1 month ago  Lipid results: Due for labs  Exacerbating diseases include diabetes  Pertinent negatives include no chest pain, myalgias or shortness of breath  Current antihyperlipidemic treatment includes diet change and exercise  There are no compliance problems  Risk factors for coronary artery disease include diabetes mellitus, dyslipidemia and post-menopausal     patient unable to go for labs due to recent demise of her father  Patient states that she has made a lot of changes to her diet  Has lost 10 pounds in the past few months    Trying to eat healthy and exercise on a daily basis  States that she feels very healthy  Is requesting a copy of the lab orders  Past Medical History:   Diagnosis Date    History of transfusion 2017    given due to Hgb at 6 6, no rx     (spontaneous vaginal delivery)     x4       Past Surgical History:   Procedure Laterality Date    NM CYSTOURETHROSCOPY N/A 2018    Procedure: CYSTOSCOPY;  Surgeon: Jere Butt MD;  [facility];  Service: Gynecology    NM LAPAROSCOPY W TOT HYSTERECTUTERUS <=250 Faina Snow  W TUBE/OVARY N/A 2018    Procedure: TOTAL LAPAROSCOPIC HYSTERECTOMY; BILATERAL SALPINGECTOMY;  Surgeon: Jere Butt MD;  [facility];  Service: Gynecology    NM TOTAL ABDOM HYSTERECTOMY N/A 2018    Procedure: TOTAL ABDOMINAL HYSTERECTOMY;  Surgeon: Jere Butt MD;  [facility];  Service: Gynecology    SALPINGECTOMY Bilateral 2018    Procedure: SALPINGECTOMY;  Surgeon: Jere Butt MD;  [facility];  Service: Gynecology    WISDOM TOOTH EXTRACTION         No current outpatient medications on file  No current facility-administered medications for this visit  No Known Allergies    Review of Systems   Constitutional: Negative  Eyes: Negative  Respiratory: Negative  Negative for shortness of breath  Cardiovascular: Negative  Negative for chest pain and palpitations  Gastrointestinal: Negative  Endocrine: Negative  Negative for polydipsia, polyphagia and polyuria  Genitourinary: Negative  Musculoskeletal: Negative  Negative for myalgias  Neurological: Negative  Negative for headaches  Psychiatric/Behavioral: Negative  Video Exam    Vitals:    20 0838   BP: 120/80   Weight: 88 9 kg (196 lb)   Height: 5' 6" (1 676 m)       Physical Exam  Constitutional:       Appearance: She is well-developed  Pulmonary:      Effort: No respiratory distress  Breath sounds: Normal breath sounds  No wheezing     Neurological: Mental Status: She is alert and oriented to person, place, and time  Psychiatric:         Behavior: Behavior normal          Thought Content: Thought content normal          Judgment: Judgment normal           I spent The15 minutes with patient today in which greater than 50% of the time was spent in counseling/coordination of care regarding Management of symptoms  Calorie counting, meal planning  VIRTUAL VISIT DISCLAIMER    Scarlet Gruber acknowledges that she has consented to an online visit or consultation  She understands that the online visit is based solely on information provided by her, and that, in the absence of a face-to-face physical evaluation by the physician, the diagnosis she receives is both limited and provisional in terms of accuracy and completeness  This is not intended to replace a full medical face-to-face evaluation by the physician  Scarlet Gruber understands and accepts these terms

## 2020-09-23 NOTE — ASSESSMENT & PLAN NOTE
Lab Results   Component Value Date    HGBA1C 6 5 (H) 03/09/2020     Patient lost at least 10 pounds by making changes to her lifestyle  Due for metabolic labs  Encouraged to go for labs  Will be contacted with the results

## 2020-09-23 NOTE — ASSESSMENT & PLAN NOTE
BMI Counseling: Body mass index is 31 64 kg/m²  The BMI is above normal  Nutrition recommendations include reducing portion sizes, decreasing overall calorie intake, 3-5 servings of fruits/vegetables daily, reducing fast food intake, consuming healthier snacks and decreasing soda and/or juice intake  Exercise recommendations include moderate aerobic physical activity for 150 minutes/week

## 2021-09-09 DIAGNOSIS — B34.9 VIRAL INFECTION, UNSPECIFIED: Primary | ICD-10-CM

## 2021-09-09 PROCEDURE — U0003 INFECTIOUS AGENT DETECTION BY NUCLEIC ACID (DNA OR RNA); SEVERE ACUTE RESPIRATORY SYNDROME CORONAVIRUS 2 (SARS-COV-2) (CORONAVIRUS DISEASE [COVID-19]), AMPLIFIED PROBE TECHNIQUE, MAKING USE OF HIGH THROUGHPUT TECHNOLOGIES AS DESCRIBED BY CMS-2020-01-R: HCPCS | Performed by: FAMILY MEDICINE

## 2021-09-09 PROCEDURE — U0005 INFEC AGEN DETEC AMPLI PROBE: HCPCS | Performed by: FAMILY MEDICINE

## 2021-09-09 NOTE — PROGRESS NOTES
Patient asking questions regarding insulin and diet this am.  Explained to patient importance of eating after taking insulin and importance of following diabetic diet.  Pt verbalized concern of family not cooking healthy food for him.  Pt's blood sugar spiked after mother brought in [employer] fries from outside for patient.  Explained to patient this is not on a carbohydrate controlled diet and he needs more education regarding this. Mother was not around at this time.  Will get diabetes educator and nutrition involved.   Patient unvaccinated experiencing flu like symptoms that slightly started on Sunday however became worse yesterday

## 2021-09-10 ENCOUNTER — TELEMEDICINE (OUTPATIENT)
Dept: FAMILY MEDICINE CLINIC | Facility: CLINIC | Age: 51
End: 2021-09-10
Payer: COMMERCIAL

## 2021-09-10 VITALS — HEIGHT: 66 IN | BODY MASS INDEX: 31.34 KG/M2 | WEIGHT: 195 LBS

## 2021-09-10 DIAGNOSIS — U07.1 COVID-19: Primary | ICD-10-CM

## 2021-09-10 LAB — SARS-COV-2 RNA RESP QL NAA+PROBE: POSITIVE

## 2021-09-10 PROCEDURE — 99212 OFFICE O/P EST SF 10 MIN: CPT | Performed by: FAMILY MEDICINE

## 2021-09-10 NOTE — PROGRESS NOTES
COVID-19 Outpatient Progress Note    Assessment/Plan:    Problem List Items Addressed This Visit     None      Visit Diagnoses     COVID-19    -  Primary         Disposition:     I recommended continued isolation until at least 24 hours have passed since recovery defined as resolution of fever without the use of fever-reducing medications AND improvement in COVID symptoms AND 10 days have passed since onset of symptoms (or 10 days have passed since date of first positive viral diagnostic test for asymptomatic patients)  I have spent 8 minutes directly with the patient  Greater than 50% of this time was spent in counseling/coordination of care regarding: diagnostic results, prognosis, risks and benefits of treatment options, instructions for management, patient and family education and impressions  - Advised on supportive care measures regarding COVID-19 infection    - offered patient monoclonal antibody infusion treatment which she declines    - Recommend that the patient self isolate in his home for a total of 10 days  Diagnosed with COVID-19 infection on September 9, 2021 and the earliest he would be released from self isolation would be on September 20, 2021     - Advised patient to contact the office should he develop any significant respiratory symptoms such as dyspnea on exertion or overt shortness of breath  Verification of patient location:    Patient is located in the following state in which I hold an active license [place]    Encounter provider Lanny Panda DO    Provider located at [address] [place] [id number]    Recent Visits  No visits were found meeting these conditions    Showing recent visits within past 7 days and meeting all other requirements  Today's Visits  Date Type Provider Dept   09/10/21 Telemedicine Lanny Panda DO [facility]   Showing today's visits and meeting all other requirements  Future Appointments  No visits were found meeting these conditions  Showing future appointments within next 150 days and meeting all other requirements     This virtual check-in was done via [employer] and patient was informed that this is a secure, HIPAA-compliant platform  She agrees to proceed  Patient agrees to participate in a virtual check in via telephone or video visit instead of presenting to the office to address urgent/immediate medical needs  Patient is aware this is a billable service  After connecting through [facility], the patient was identified by name and date of birth  Jagdish Pena was informed that this was a telemedicine visit and that the exam was being conducted confidentially over secure lines  My office door was closed  No one else was in the room  Jagdish Pena acknowledged consent and understanding of privacy and security of the telemedicine visit  I informed the patient that I have reviewed her record in Epic and presented the opportunity for her to ask any questions regarding the visit today  The patient agreed to participate  Subjective: Jagdish Pena is a 46 y o  female who has been screened for COVID-19  Patient's symptoms include fatigue, nasal congestion, rhinorrhea, cough and myalgias  Patient denies fever, chills, malaise, sore throat, anosmia, loss of taste, shortness of breath, chest tightness, abdominal pain, nausea, vomiting, diarrhea and headaches  Date of symptom onset: 9/7/2021  Date of positive COVID-19 PCR: 9/9/2021  COVID-19 vaccination status: Not vaccinated        Staying home and isolating themselves?: has not  She is taking care to not share personal items and is cleaning all surfaces that are touched often, like counters, tabletops, and doorknobs using household cleaning sprays or wipes  She is wearing a mask when she leaves her room  Other family members coming through the living room while patient is present    Patient had mask covering her mouth but not her nose    Lab Results   Component Value Date    SARSCOV2 Positive (A) 2021     Past Medical History:   Diagnosis Date    History of transfusion 2017    given due to Hgb at 6 6, no rx     (spontaneous vaginal delivery)     x4     Past Surgical History:   Procedure Laterality Date    MT CYSTOURETHROSCOPY N/A 2018    Procedure: CYSTOSCOPY;  Surgeon: Manny Graves MD;  [facility];  Service: Gynecology    MT LAPAROSCOPY W TOT HYSTERECTUTERUS <=[address]  W TUBE/OVARY N/A 2018    Procedure: TOTAL LAPAROSCOPIC HYSTERECTOMY; BILATERAL SALPINGECTOMY;  Surgeon: Manny Graves MD;  [facility];  Service: Gynecology    MT TOTAL ABDOM HYSTERECTOMY N/A 2018    Procedure: TOTAL ABDOMINAL HYSTERECTOMY;  Surgeon: Manny Graves MD;  [facility];  Service: Gynecology    SALPINGECTOMY Bilateral 2018    Procedure: SALPINGECTOMY;  Surgeon: Manny Graves MD;  [facility];  Service: Gynecology    WISDOM TOOTH EXTRACTION       No current outpatient medications on file  No current facility-administered medications for this visit  No Known Allergies    Review of Systems   Constitutional: Positive for fatigue  Negative for chills and fever  HENT: Positive for congestion and rhinorrhea  Negative for sore throat  Respiratory: Positive for cough  Negative for chest tightness and shortness of breath  Gastrointestinal: Negative for abdominal pain, diarrhea, nausea and vomiting  Musculoskeletal: Positive for myalgias  Neurological: Negative for headaches  Objective:    Vitals:    09/10/21 1433   Weight: 88 5 kg (195 lb)   Height: 5' 6" (1 676 m)       Physical Exam  Constitutional:       General: She is not in acute distress  Appearance: Normal appearance  She is well-developed  She is ill-appearing  She is not toxic-appearing or diaphoretic  HENT:      Head: Normocephalic and atraumatic     Eyes:      Extraocular Movements: Extraocular movements intact  Pupils: Pupils are equal, round, and reactive to light  Pulmonary:      Effort: Pulmonary effort is normal       Breath sounds: No wheezing  Neurological:      General: No focal deficit present  Mental Status: She is alert and oriented to person, place, and time  Psychiatric:         Mood and Affect: Mood normal          Behavior: Behavior normal          Thought Content: Thought content normal          Judgment: Judgment normal          VIRTUAL VISIT DISCLAIMER    Natalya Interiano verbally agrees to participate in [employer]  Pt is aware that [employer] could be limited without vital signs or the ability to perform a full hands-on physical Gilbert Sergeant understands she or the provider may request at any time to terminate the video visit and request the patient to seek care or treatment in person

## 2021-09-14 ENCOUNTER — TELEPHONE (OUTPATIENT)
Dept: FAMILY MEDICINE CLINIC | Facility: CLINIC | Age: 51
End: 2021-09-14

## 2021-09-14 NOTE — TELEPHONE ENCOUNTER
Alona from [address] [place] [address] called in to get the results of this patient's covid test  Joao Montes filed a disability claim due to COVID but did not include any results  They also do not have a records release for the patient   If we can, we can either call back w/ verbal results    Phone: [phone] using claim #: [id number]    Or fax to:    Fax: [phone] w/ claim # on cover page

## 2021-09-15 ENCOUNTER — TELEMEDICINE (OUTPATIENT)
Dept: FAMILY MEDICINE CLINIC | Facility: CLINIC | Age: 51
End: 2021-09-15
Payer: COMMERCIAL

## 2021-09-15 VITALS — HEIGHT: 66 IN | WEIGHT: 195 LBS | BODY MASS INDEX: 31.34 KG/M2

## 2021-09-15 DIAGNOSIS — U07.1 COVID-19 VIRUS DETECTED: Primary | ICD-10-CM

## 2021-09-15 PROCEDURE — 99212 OFFICE O/P EST SF 10 MIN: CPT | Performed by: FAMILY MEDICINE

## 2021-09-15 NOTE — TELEPHONE ENCOUNTER
Called [employer] and received verbal permission to release COVID results to the disability company for the claim that she filed

## 2021-09-15 NOTE — PROGRESS NOTES
COVID-19 Outpatient Progress Note    Assessment/Plan:    Problem List Items Addressed This Visit        Other    COVID-19 virus detected - Primary     Patient reporting mild improvement in initial symptoms  3 episodes of diarrhea yesterday, none today  Patient encouraged supportive care including increase intake of clear fluids, low-fiber diet  Prefer to continue monitoring symptoms  Agreeable for vaccination  Suggested to wait 10 days quarantine following which she will be eligible for the vaccine  Patient will call the office if she is unable to schedule the vaccine  Patient is very anxious and scared  Reassured  Advised to proceed to the ER if she develops any shortness of breath or chest pain or if her symptoms worsen  Disposition:     I recommended continued isolation until at least 24 hours have passed since recovery defined as resolution of fever without the use of fever-reducing medications AND improvement in COVID symptoms AND 10 days have passed since onset of symptoms (or 10 days have passed since date of first positive viral diagnostic test for asymptomatic patients)  I have spent 10 minutes directly with the patient  Greater than 50% of this time was spent in counseling/coordination of care regarding: prognosis, risks and benefits of treatment options, instructions for management, patient and family education, importance of treatment compliance, risk factor reductions and impressions          Verification of patient location:    Patient is located in the following state in which I hold an active license [place]    Encounter provider Joyce Sargent MD    Provider located at [address] [phone]    Recent Visits  Date Type Provider Dept   09/14/21 Telephone Jess Hernandez MA [facility]   09/10/21 [address], [address] [address] recent visits within past 7 days and meeting all other requirements  Today's Visits  Date Type Provider Dept   09/15/21 Telemedicine MD Chad Corral   Showing today's visits and meeting all other requirements  Future Appointments  No visits were found meeting these conditions  Showing future appointments within next 150 days and meeting all other requirements     This virtual check-in was done via [email or web address] and patient was informed that this is a secure, HIPAA-compliant platform  She agrees to proceed  Patient agrees to participate in a virtual check in via telephone or video visit instead of presenting to the office to address urgent/immediate medical needs  Patient is aware this is a billable service  After connecting through [facility], the patient was identified by name and date of birth  Dorian Dickson was informed that this was a telemedicine visit and that the exam was being conducted confidentially over secure lines  My office door was closed  No one else was in the room  Dorian Dickson acknowledged consent and understanding of privacy and security of the telemedicine visit  I informed the patient that I have reviewed her record in Epic and presented the opportunity for her to ask any questions regarding the visit today  The patient agreed to participate  Subjective: Dorian Dickson is a 46 y o  female who has been screened for COVID-19  Symptom change since last report: resolving  Patient's symptoms include fatigue (Improving), loss of taste and diarrhea  Patient denies fever, chills, malaise, congestion, sore throat, anosmia, cough, shortness of breath, chest tightness, abdominal pain, nausea, vomiting, myalgias and headaches  Date of positive COVID-19 PCR: 9/9/2021  COVID-19 vaccination status: Not vaccinated    Rebeka Yun has been staying home and has isolated themselves in her home   She is taking care to not share personal items and is cleaning all surfaces that are touched often, like counters, tabletops, and doorknobs using household cleaning sprays or wipes  She is wearing a mask when she leaves her room  Patient had 3 episodes of diarrhea yesterday  Today she had none  Lab Results   Component Value Date    SARSCOV2 Positive (A) 2021     Past Medical History:   Diagnosis Date    History of transfusion 2017    given due to Hgb at 6 6, no rx     (spontaneous vaginal delivery)     x4     Past Surgical History:   Procedure Laterality Date    [place] CYSTOURETHROSCOPY N/A 2018    Procedure: CYSTOSCOPY;  Surgeon: Nayla Danielle MD;  [facility];  Service: Gynecology    [place] LAPAROSCOPY W TOT HYSTERECTUTERUS <=[address]  W TUBE/OVARY N/A 2018    Procedure: TOTAL LAPAROSCOPIC HYSTERECTOMY; BILATERAL SALPINGECTOMY;  Surgeon: Nayla Danielle MD;  [facility];  Service: Gynecology    [place] TOTAL ABDOM HYSTERECTOMY N/A 2018    Procedure: TOTAL ABDOMINAL HYSTERECTOMY;  Surgeon: Nayla Danielle MD;  [facility];  Service: Gynecology    SALPINGECTOMY Bilateral 2018    Procedure: SALPINGECTOMY;  Surgeon: Nayla Dnaielle MD;  [facility];  Service: Gynecology    WISDOM TOOTH EXTRACTION       No current outpatient medications on file  No current facility-administered medications for this visit  No Known Allergies    Review of Systems   Constitutional: Positive for fatigue (Improving)  Negative for chills and fever  HENT: Negative for congestion and sore throat  Eyes: Negative  Respiratory: Negative  Negative for cough, chest tightness and shortness of breath  Cardiovascular: Negative  Negative for chest pain and palpitations  Gastrointestinal: Positive for diarrhea  Negative for abdominal pain, nausea and vomiting  Endocrine: Negative  Genitourinary: Negative  Musculoskeletal: Negative  Negative for myalgias  Neurological: Negative  Negative for headaches  Psychiatric/Behavioral: Negative          Objective:    Vitals:    09/15/21 4610 Weight: 88 5 kg (195 lb)   Height: 5' 6" (1 676 m)       Physical Exam  Constitutional:       General: She is not in acute distress  Appearance: She is well-developed  Pulmonary:      Effort: Pulmonary effort is normal    Neurological:      Mental Status: She is alert and oriented to person, place, and time  Psychiatric:         Thought Content: Thought content normal       Comments: Tearful, anxious  VIRTUAL VISIT DISCLAIMER    Jagdish Pena verbally agrees to participate in [employer]  Pt is aware that [employer] could be limited without vital signs or the ability to perform a full hands-on physical Vermaeve Govea understands she or the provider may request at any time to terminate the video visit and request the patient to seek care or treatment in person

## 2021-09-15 NOTE — ASSESSMENT & PLAN NOTE
Patient reporting mild improvement in initial symptoms  3 episodes of diarrhea yesterday, none today  Patient encouraged supportive care including increase intake of clear fluids, low-fiber diet  Prefer to continue monitoring symptoms  Agreeable for vaccination  Suggested to wait 10 days quarantine following which she will be eligible for the vaccine  Patient will call the office if she is unable to schedule the vaccine  Patient is very anxious and scared  Reassured  Advised to proceed to the ER if she develops any shortness of breath or chest pain or if her symptoms worsen

## 2021-09-23 DIAGNOSIS — U07.1 COVID-19: Primary | ICD-10-CM

## 2021-09-23 PROCEDURE — U0003 INFECTIOUS AGENT DETECTION BY NUCLEIC ACID (DNA OR RNA); SEVERE ACUTE RESPIRATORY SYNDROME CORONAVIRUS 2 (SARS-COV-2) (CORONAVIRUS DISEASE [COVID-19]), AMPLIFIED PROBE TECHNIQUE, MAKING USE OF HIGH THROUGHPUT TECHNOLOGIES AS DESCRIBED BY CMS-2020-01-R: HCPCS | Performed by: FAMILY MEDICINE

## 2021-09-23 PROCEDURE — U0005 INFEC AGEN DETEC AMPLI PROBE: HCPCS | Performed by: FAMILY MEDICINE

## 2021-09-23 NOTE — PROGRESS NOTES
Patient stated that she needs negative COVID to return to work per her employer  Patient was advised that she is within her 90 day infection period and test may still come back as positive

## 2021-09-24 LAB — SARS-COV-2 RNA RESP QL NAA+PROBE: NEGATIVE

## 2021-09-28 ENCOUNTER — OFFICE VISIT (OUTPATIENT)
Dept: FAMILY MEDICINE CLINIC | Facility: CLINIC | Age: 51
End: 2021-09-28
Payer: COMMERCIAL

## 2021-09-28 VITALS
DIASTOLIC BLOOD PRESSURE: 80 MMHG | BODY MASS INDEX: 31.92 KG/M2 | WEIGHT: 198.6 LBS | HEART RATE: 80 BPM | SYSTOLIC BLOOD PRESSURE: 122 MMHG | RESPIRATION RATE: 16 BRPM | OXYGEN SATURATION: 97 % | HEIGHT: 66 IN

## 2021-09-28 DIAGNOSIS — E78.2 MIXED HYPERLIPIDEMIA: ICD-10-CM

## 2021-09-28 DIAGNOSIS — E11.9 CONTROLLED TYPE 2 DIABETES MELLITUS WITHOUT COMPLICATION, WITHOUT LONG-TERM CURRENT USE OF INSULIN (HCC): ICD-10-CM

## 2021-09-28 DIAGNOSIS — U09.9 POST-COVID SYNDROME: Primary | ICD-10-CM

## 2021-09-28 PROCEDURE — 99214 OFFICE O/P EST MOD 30 MIN: CPT | Performed by: FAMILY MEDICINE

## 2021-09-28 NOTE — PROGRESS NOTES
Patient Name: Glen Song     : 1970     MRN: [id number]    Assessment/Plan:    Problem List Items Addressed This Visit        Endocrine    Controlled type 2 diabetes mellitus without complication, without long-term current use of insulin ([facility] [address] )       Lab Results   Component Value Date    HGBA1C 6 5 (H) 2020    Due for metabolic labs  Relevant Orders    Diabetic foot exam    Comprehensive metabolic panel    Hemoglobin A1C    Microalbumin / creatinine urine ratio       Other    Hyperlipidemia    Relevant Orders    Lipid panel    Post-COVID syndrome - Primary     Symptomatic with fatigue/ SOB  Suggested PT, Patient requesting Short term disability for 2 weeks         Relevant Orders    Ambulatory referral to Physical Therapy    Ambulatory referral to Occupational Therapy    Ambulatory referral to Speech Therapy        Discussion:     Labs recommended:  CMP and A1c, lipid    Referrals recommended:  Physical therapy    Other management recommendations:     Dyspnea & cough   Discussed the need for deep breathing and breathing exercises      BMI Counseling: Body mass index is 32 05 kg/m²  The BMI is above normal  Nutrition recommendations include decreasing portion sizes, encouraging healthy choices of fruits and vegetables and decreasing fast food intake  Exercise recommendations include moderate physical activity 150 minutes/week  No pharmacotherapy was ordered  Rationale for BMI follow-up plan is due to patient being overweight or obese  Depression Screening and Follow-up Plan:   Patient was screened for depression during today's encounter  They screened negative with a PHQ-2 score of 0  I spent 25 minutes with patient today in which greater than 50% of the time was spent in counseling/coordination of care regarding Suggesting treatment options, management of symptoms       Subjective:    COVID-19 Infection:  Date of symptom onset: 2021  Date of positive test: 2021    Initial symptoms with acute illness:  Initial symptoms included: fever, chills, cough, nasal congestion and sore throat  New or persistent symptoms:  Patient complains of: fatigue, poor endurance and shortness of breath  Patient denies: headache and muscle aches  Patient rates severity of current symptoms as moderate  Inpatient treatment:      Was patient hospitalized?: No      Outpatient treatment:      Did patient receive monoclonal antibody therapy?: No      Review of Systems   Constitutional: Positive for fatigue  Eyes: Negative  Respiratory: Positive for shortness of breath  Cardiovascular: Negative  Negative for chest pain and palpitations  Gastrointestinal: Negative  Endocrine: Negative  Genitourinary: Negative  Musculoskeletal: Negative  Negative for myalgias  Neurological: Negative  Negative for headaches  Psychiatric/Behavioral: Negative  Patient Active Problem List   Diagnosis    S/P abdominal hysterectomy    Screen for colon cancer    BMI 32 0-32 9,adult    Controlled type 2 diabetes mellitus without complication, without long-term current use of insulin [facility]    Hyperlipidemia    COVID-19 virus detected    Post-COVID syndrome     Social History     Tobacco Use    Smoking status: Former Smoker     Quit date:      Years since quittin 7    Smokeless tobacco: Never Used   Vaping Use    Vaping Use: Never used   Substance Use Topics    Alcohol use: Yes     Alcohol/week: 2 0 standard drinks     Types: 2 Glasses of wine per week     Comment: social    Drug use: No      Objective:  /80   Pulse 80   Resp 16   Ht 5' 6" (1 676 m)   Wt 90 1 kg (198 lb 9 6 oz)   LMP 2017   SpO2 97%   BMI 32 05 kg/m²      Physical Exam  Vitals and nursing note reviewed  Constitutional:       General: She is not in acute distress  Appearance: She is well-developed  HENT:      Head: Normocephalic and atraumatic     Eyes:      Conjunctiva/sclera: Conjunctivae normal    Cardiovascular:      Rate and Rhythm: Normal rate and regular rhythm  Pulses: no weak pulses     Heart sounds: No murmur heard  Pulmonary:      Effort: Pulmonary effort is normal  No respiratory distress  Breath sounds: Normal breath sounds  No wheezing or rhonchi  Abdominal:      Palpations: Abdomen is soft  Tenderness: There is no abdominal tenderness  Musculoskeletal:      Cervical back: Neck supple  Feet:      Right foot:      Skin integrity: No ulcer, skin breakdown, erythema, warmth, callus or dry skin  Left foot:      Skin integrity: No ulcer, skin breakdown, erythema, warmth, callus or dry skin  Skin:     General: Skin is warm and dry  Neurological:      Mental Status: She is alert  Patient's shoes and socks removed  Right Foot/Ankle   Right Foot Inspection  Skin Exam: skin normal and skin intact no dry skin, no warmth, no callus, no erythema, no maceration, no abnormal color, no pre-ulcer, no ulcer and no callus                          Toe Exam: ROM and strength within normal limits  Sensory   Vibration: intact  Proprioception: intact   Monofilament testing: intact  Vascular  Capillary refills: < 3 seconds      Left Foot/Ankle  Left Foot Inspection  Skin Exam: skin normal and skin intactno dry skin, no warmth, no erythema, no maceration, normal color, no pre-ulcer, no ulcer and no callus                         Toe Exam: ROM and strength within normal limits                   Sensory   Vibration: intact  Proprioception: intact  Monofilament: intact  Vascular  Capillary refills: < 3 seconds    Assign Risk Category:  No deformity present; No loss of protective sensation;  No weak pulses       Risk: 0      Trina Swanson MD

## 2021-09-28 NOTE — PROGRESS NOTES
Subjective:      Patient ID: Raimundo Agudelo is a 46 y o  female  HPI    Past Medical History:   Diagnosis Date    History of transfusion 2017    given due to Hgb at 6 6, no rx     (spontaneous vaginal delivery)     x4       Family History   Problem Relation Age of Onset    Lung cancer Father        Past Surgical History:   Procedure Laterality Date    [place] CYSTOURETHROSCOPY N/A 2018    Procedure: CYSTOSCOPY;  Surgeon: Sarah Benites MD;  [facility];  Service: Gynecology    [place] LAPAROSCOPY Braxton Austyn <=250 Allena Genin  W TUBE/OVARY N/A 2018    Procedure: TOTAL LAPAROSCOPIC HYSTERECTOMY; BILATERAL SALPINGECTOMY;  Surgeon: Sarah Benites MD;  [facility];  Service: Gynecology    [place] TOTAL ABDOM HYSTERECTOMY N/A 2018    Procedure: TOTAL ABDOMINAL HYSTERECTOMY;  Surgeon: Sarah Benites MD;  [facility];  Service: Gynecology    SALPINGECTOMY Bilateral 2018    Procedure: SALPINGECTOMY;  Surgeon: Sarah Benites MD;  [facility];  Service: Gynecology    WISDOM TOOTH EXTRACTION          reports that she quit smoking about 13 years ago  She has never used smokeless tobacco  She reports current alcohol use of about 2 0 standard drinks of alcohol per week  She reports that she does not use drugs  No current outpatient medications on file  The following portions of the patient's history were reviewed and updated as appropriate: allergies, current medications, past family history, past medical history, past social history, past surgical history and problem list     Review of Systems   Constitutional: Negative  Eyes: Negative  Respiratory: Negative  Negative for shortness of breath  Cardiovascular: Negative  Negative for chest pain and palpitations  Gastrointestinal: Negative  Endocrine: Negative  Genitourinary: Negative  Musculoskeletal: Negative  Negative for myalgias  Neurological: Negative    Negative for headaches  Psychiatric/Behavioral: Negative  Objective:    /80   Pulse 80   Resp 16   Ht 5' 6" (1 676 m)   Wt 90 1 kg (198 lb 9 6 oz)   LMP 11/08/2017   SpO2 97%   BMI 32 05 kg/m²      Physical Exam  Constitutional:       Appearance: She is well-developed  HENT:      Head: Normocephalic  Right Ear: External ear normal       Left Ear: External ear normal    Eyes:      Pupils: Pupils are equal, round, and reactive to light  Cardiovascular:      Rate and Rhythm: Normal rate and regular rhythm  Pulses: no weak pulses  Pulmonary:      Effort: Pulmonary effort is normal       Breath sounds: Normal breath sounds  Abdominal:      General: Bowel sounds are normal       Palpations: Abdomen is soft  Musculoskeletal:         General: Normal range of motion  Cervical back: Normal range of motion and neck supple  Feet:      Right foot:      Skin integrity: No ulcer, skin breakdown, erythema, warmth, callus or dry skin  Left foot:      Skin integrity: No ulcer, skin breakdown, erythema, warmth, callus or dry skin  Neurological:      Mental Status: She is alert and oriented to person, place, and time  Psychiatric:         Behavior: Behavior normal          Judgment: Judgment normal          Patient's shoes and socks removed  Right Foot/Ankle   Right Foot Inspection  Skin Exam: skin normal and skin intact no dry skin, no warmth, no callus, no erythema, no maceration, no abnormal color, no pre-ulcer, no ulcer and no callus                          Toe Exam: ROM and strength within normal limits  Sensory   Vibration: intact  Proprioception: intact   Monofilament testing: intact  Vascular  Capillary refills: < 3 seconds      Left Foot/Ankle  Left Foot Inspection  Skin Exam: skin normal and skin intactno dry skin, no warmth, no erythema, no maceration, normal color, no pre-ulcer, no ulcer and no callus                         Toe Exam: ROM and strength within normal limits Sensory   Vibration: intact  Proprioception: intact  Monofilament: intact  Vascular  Capillary refills: < 3 seconds    Assign Risk Category:  No deformity present; No loss of protective sensation; No weak pulses       Risk: 0      Recent Results (from the past 1008 hour(s))   Novel Coronavirus (Covid-19),PCR [facility] - Collected in Office    Collection Time: 09/09/21  2:53 PM    Specimen: Nose; Nares   Result Value Ref Range    SARS-CoV-2 Positive (A) Negative   Novel Coronavirus (Covid-19),PCR [facility] - Collected in Office    Collection Time: 09/23/21  2:52 PM    Specimen: Nose; Nares   Result Value Ref Range    SARS-CoV-2 Negative Negative       Assessment/Plan:    No problem-specific Assessment & Plan notes found for this encounter           {Assess/PlanSmartLinks:12297}

## 2021-10-05 ENCOUNTER — EVALUATION (OUTPATIENT)
Dept: PHYSICAL THERAPY | Facility: CLINIC | Age: 51
End: 2021-10-05
Payer: COMMERCIAL

## 2021-10-05 ENCOUNTER — APPOINTMENT (OUTPATIENT)
Dept: LAB | Facility: CLINIC | Age: 51
End: 2021-10-05
Payer: COMMERCIAL

## 2021-10-05 DIAGNOSIS — U09.9 POST-COVID SYNDROME: Primary | ICD-10-CM

## 2021-10-05 DIAGNOSIS — E11.9 CONTROLLED TYPE 2 DIABETES MELLITUS WITHOUT COMPLICATION, WITHOUT LONG-TERM CURRENT USE OF INSULIN (HCC): ICD-10-CM

## 2021-10-05 DIAGNOSIS — E78.2 MIXED HYPERLIPIDEMIA: ICD-10-CM

## 2021-10-05 PROCEDURE — 97162 PT EVAL MOD COMPLEX 30 MIN: CPT | Performed by: PHYSICAL THERAPIST

## 2021-10-07 ENCOUNTER — APPOINTMENT (OUTPATIENT)
Dept: LAB | Facility: CLINIC | Age: 51
End: 2021-10-07
Payer: COMMERCIAL

## 2021-10-07 ENCOUNTER — OFFICE VISIT (OUTPATIENT)
Dept: PHYSICAL THERAPY | Facility: CLINIC | Age: 51
End: 2021-10-07
Payer: COMMERCIAL

## 2021-10-07 DIAGNOSIS — U09.9 POST-COVID SYNDROME: Primary | ICD-10-CM

## 2021-10-07 LAB
ALBUMIN SERPL BCP-MCNC: 3.4 G/DL (ref 3.5–5)
ALP SERPL-CCNC: 119 U/L (ref 46–116)
ALT SERPL W P-5'-P-CCNC: 85 U/L (ref 12–78)
ANION GAP SERPL CALCULATED.3IONS-SCNC: 7 MMOL/L (ref 4–13)
AST SERPL W P-5'-P-CCNC: 51 U/L (ref 5–45)
BILIRUB SERPL-MCNC: 0.43 MG/DL (ref 0.2–1)
BUN SERPL-MCNC: 9 MG/DL (ref 5–25)
CALCIUM ALBUM COR SERPL-MCNC: 10.4 MG/DL (ref 8.3–10.1)
CALCIUM SERPL-MCNC: 9.9 MG/DL (ref 8.3–10.1)
CHLORIDE SERPL-SCNC: 106 MMOL/L (ref 100–108)
CHOLEST SERPL-MCNC: 173 MG/DL (ref 50–200)
CO2 SERPL-SCNC: 26 MMOL/L (ref 21–32)
CREAT SERPL-MCNC: 0.92 MG/DL (ref 0.6–1.3)
CREAT UR-MCNC: 164 MG/DL
EST. AVERAGE GLUCOSE BLD GHB EST-MCNC: 200 MG/DL
GFR SERPL CREATININE-BSD FRML MDRD: 83 ML/MIN/1.73SQ M
GLUCOSE P FAST SERPL-MCNC: 229 MG/DL (ref 65–99)
HBA1C MFR BLD: 8.6 %
HDLC SERPL-MCNC: 45 MG/DL
LDLC SERPL CALC-MCNC: 108 MG/DL (ref 0–100)
MICROALBUMIN UR-MCNC: 21.7 MG/L (ref 0–20)
MICROALBUMIN/CREAT 24H UR: 13 MG/G CREATININE (ref 0–30)
NONHDLC SERPL-MCNC: 128 MG/DL
POTASSIUM SERPL-SCNC: 4.1 MMOL/L (ref 3.5–5.3)
PROT SERPL-MCNC: 7.3 G/DL (ref 6.4–8.2)
SODIUM SERPL-SCNC: 139 MMOL/L (ref 136–145)
TRIGL SERPL-MCNC: 99 MG/DL

## 2021-10-07 PROCEDURE — 97110 THERAPEUTIC EXERCISES: CPT | Performed by: PHYSICAL THERAPIST

## 2021-10-07 PROCEDURE — 80061 LIPID PANEL: CPT

## 2021-10-07 PROCEDURE — 3052F HG A1C>EQUAL 8.0%<EQUAL 9.0%: CPT | Performed by: FAMILY MEDICINE

## 2021-10-07 PROCEDURE — 82043 UR ALBUMIN QUANTITATIVE: CPT | Performed by: FAMILY MEDICINE

## 2021-10-07 PROCEDURE — 3061F NEG MICROALBUMINURIA REV: CPT | Performed by: FAMILY MEDICINE

## 2021-10-07 PROCEDURE — 82570 ASSAY OF URINE CREATININE: CPT | Performed by: FAMILY MEDICINE

## 2021-10-07 PROCEDURE — 36415 COLL VENOUS BLD VENIPUNCTURE: CPT

## 2021-10-07 PROCEDURE — 83036 HEMOGLOBIN GLYCOSYLATED A1C: CPT

## 2021-10-07 PROCEDURE — 80053 COMPREHEN METABOLIC PANEL: CPT

## 2021-10-08 ENCOUNTER — TELEPHONE (OUTPATIENT)
Dept: FAMILY MEDICINE CLINIC | Facility: CLINIC | Age: 51
End: 2021-10-08

## 2021-10-11 ENCOUNTER — OFFICE VISIT (OUTPATIENT)
Dept: PHYSICAL THERAPY | Facility: CLINIC | Age: 51
End: 2021-10-11
Payer: COMMERCIAL

## 2021-10-11 DIAGNOSIS — U09.9 POST-COVID SYNDROME: Primary | ICD-10-CM

## 2021-10-11 PROCEDURE — 97110 THERAPEUTIC EXERCISES: CPT | Performed by: PHYSICAL THERAPIST

## 2021-10-12 ENCOUNTER — APPOINTMENT (OUTPATIENT)
Dept: PHYSICAL THERAPY | Facility: CLINIC | Age: 51
End: 2021-10-12
Payer: COMMERCIAL

## 2021-10-19 ENCOUNTER — APPOINTMENT (OUTPATIENT)
Dept: PHYSICAL THERAPY | Facility: CLINIC | Age: 51
End: 2021-10-19
Payer: COMMERCIAL

## 2021-10-21 ENCOUNTER — APPOINTMENT (OUTPATIENT)
Dept: PHYSICAL THERAPY | Facility: CLINIC | Age: 51
End: 2021-10-21
Payer: COMMERCIAL

## 2021-10-28 ENCOUNTER — APPOINTMENT (OUTPATIENT)
Dept: PHYSICAL THERAPY | Facility: CLINIC | Age: 51
End: 2021-10-28
Payer: COMMERCIAL

## 2021-11-28 DIAGNOSIS — E11.9 CONTROLLED TYPE 2 DIABETES MELLITUS WITHOUT COMPLICATION, WITHOUT LONG-TERM CURRENT USE OF INSULIN (HCC): ICD-10-CM

## 2022-01-13 ENCOUNTER — TELEMEDICINE (OUTPATIENT)
Dept: FAMILY MEDICINE CLINIC | Facility: CLINIC | Age: 52
End: 2022-01-13
Payer: COMMERCIAL

## 2022-01-13 VITALS
DIASTOLIC BLOOD PRESSURE: 80 MMHG | WEIGHT: 198 LBS | HEIGHT: 66 IN | BODY MASS INDEX: 31.82 KG/M2 | SYSTOLIC BLOOD PRESSURE: 120 MMHG

## 2022-01-13 DIAGNOSIS — M25.551 CHRONIC PAIN OF BOTH HIPS: ICD-10-CM

## 2022-01-13 DIAGNOSIS — M25.552 CHRONIC PAIN OF BOTH HIPS: ICD-10-CM

## 2022-01-13 DIAGNOSIS — G89.29 CHRONIC PAIN OF BOTH HIPS: ICD-10-CM

## 2022-01-13 DIAGNOSIS — Z12.31 VISIT FOR SCREENING MAMMOGRAM: ICD-10-CM

## 2022-01-13 DIAGNOSIS — E78.2 MIXED HYPERLIPIDEMIA: ICD-10-CM

## 2022-01-13 DIAGNOSIS — E11.9 CONTROLLED TYPE 2 DIABETES MELLITUS WITHOUT COMPLICATION, WITHOUT LONG-TERM CURRENT USE OF INSULIN (HCC): Primary | ICD-10-CM

## 2022-01-13 PROBLEM — U07.1 COVID-19 VIRUS DETECTED: Status: RESOLVED | Noted: 2021-09-15 | Resolved: 2022-01-13

## 2022-01-13 PROBLEM — U09.9 POST-COVID SYNDROME: Status: RESOLVED | Noted: 2021-09-28 | Resolved: 2022-01-13

## 2022-01-13 PROCEDURE — 3008F BODY MASS INDEX DOCD: CPT | Performed by: FAMILY MEDICINE

## 2022-01-13 PROCEDURE — 99214 OFFICE O/P EST MOD 30 MIN: CPT | Performed by: FAMILY MEDICINE

## 2022-01-13 NOTE — PROGRESS NOTES
Virtual Regular Visit    Verification of patient location:    Patient is located in the following state in which I hold an active license [place]      Assessment/Plan:    Problem List Items Addressed This Visit        Endocrine    Controlled type 2 diabetes mellitus without complication, without long-term current use of insulin ([facility] ) - Primary       Lab Results   Component Value Date    HGBA1C 8 6 (H) 10/07/2021     PATIENT DUE FOR LABS  Currently on metformin 500 b i d   Initial GI side effects have resolved now  Patient will be contacted with results  Recommended metabolic labs/follow-up at 4 months interval          Relevant Orders    Comprehensive metabolic panel    Hemoglobin A1C       Other    Visit for screening mammogram    Relevant Orders    Mammo screening bilateral w 3d & cad    Hyperlipidemia     Patient encouraged to go for metabolic labs  Will be contacted with results  Relevant Orders    Lipid panel    Chronic pain of both hips     Check x-ray to rule out arthritis  Patient will be contacted with the results, can start physical therapy thereafter         Relevant Orders    XR hip/pelv 2-3 vws left if performed    XR hip/pelv 2-3 vws right if performed    Ambulatory Referral to Physical Therapy          BMI Counseling: Body mass index is 31 96 kg/m²  The BMI is above normal  Nutrition recommendations include decreasing portion sizes, encouraging healthy choices of fruits and vegetables and decreasing fast food intake  Exercise recommendations include moderate physical activity 150 minutes/week  No pharmacotherapy was ordered  Rationale for BMI follow-up plan is due to patient being overweight or obese           Reason for visit is   Chief Complaint   Patient presents with    Virtual Regular Visit        Encounter provider Johnna Brumfield MD    Provider located at [address] [place] [id number]      Recent Visits  No visits were found meeting these conditions  Showing recent visits within past 7 days and meeting all other requirements  Today's Visits  Date Type Provider Dept   01/13/22 Telemedicine MD Chad Sandoval   Showing today's visits and meeting all other requirements  Future Appointments  No visits were found meeting these conditions  Showing future appointments within next 150 days and meeting all other requirements       The patient was identified by name and date of birth  Lisa Wilburn was informed that this is a telemedicine visit and that the visit is being conducted through [address] [facility] and patient was informed that this is a secure, HIPAA-compliant platform  She agrees to proceed     My office door was closed  No one else was in the room  She acknowledged consent and understanding of privacy and security of the video platform  The patient has agreed to participate and understands they can discontinue the visit at any time  Patient is aware this is a billable service  Subjective  Lisa Wilburn is a 46 y o  female    Diabetes  She presents for her follow-up diabetic visit  She has type 2 diabetes mellitus  Disease course: Due for metabolic labs  There are no hypoglycemic associated symptoms  Pertinent negatives for hypoglycemia include no headaches  Pertinent negatives for diabetes include no chest pain, no polydipsia, no polyuria and no visual change  There are no hypoglycemic complications  Risk factors for coronary artery disease include diabetes mellitus and post-menopausal  Current diabetic treatments: Metformin 500 milligram b i d  Eye exam is current  Hip Pain   The incident occurred more than 1 week ago  The incident occurred at home  There was no injury mechanism  The pain is present in the left hip and right hip  The pain is at a severity of 6/10  The pain is moderate  The pain has been fluctuating since onset  Pertinent negatives include no muscle weakness, numbness or tingling   The symptoms are aggravated by weight bearing  She has tried nothing for the symptoms  Patient unable to go for labs due to worsening COVID pandemic  Plans to go for labs in a week  Past Medical History:   Diagnosis Date    History of transfusion 2017    given due to Hgb at 6 6, no rx     (spontaneous vaginal delivery)     x4       Past Surgical History:   Procedure Laterality Date    [place] CYSTOURETHROSCOPY N/A 2018    Procedure: CYSTOSCOPY;  Surgeon: Bharti Hoover MD;  [facility];  Service: Gynecology    [place] LAPAROSCOPY W TOT HYSTERECTUTERUS <=250 Rody Lipps  W TUBE/OVARY N/A 2018    Procedure: TOTAL LAPAROSCOPIC HYSTERECTOMY; BILATERAL SALPINGECTOMY;  Surgeon: Bharti Hoover MD;  [facility];  Service: Gynecology    [place] TOTAL ABDOM HYSTERECTOMY N/A 2018    Procedure: TOTAL ABDOMINAL HYSTERECTOMY;  Surgeon: Bharti Hoover MD;  [facility];  Service: Gynecology    SALPINGECTOMY Bilateral 2018    Procedure: SALPINGECTOMY;  Surgeon: Bharti Hoover MD;  [facility];  Service: Gynecology    WISDOM TOOTH EXTRACTION         Current Outpatient Medications   Medication Sig Dispense Refill    metFORMIN (GLUCOPHAGE) 500 mg tablet TAKE 1 TABLET BY MOUTH TWICE A DAY WITH MEALS 180 tablet 1     No current facility-administered medications for this visit  No Known Allergies    Review of Systems   Constitutional: Negative  Eyes: Negative  Respiratory: Negative  Negative for shortness of breath  Cardiovascular: Negative  Negative for chest pain and palpitations  Gastrointestinal: Negative  Endocrine: Negative  Negative for polydipsia and polyuria  Genitourinary: Negative  Musculoskeletal: Negative  Negative for myalgias  Neurological: Negative  Negative for tingling, numbness and headaches  Psychiatric/Behavioral: Negative          Video Exam    Vitals:    22 0830   BP: 120/80   Weight: 89 8 kg (198 lb) Height: 5' 6" (1 676 m)       Physical Exam  Constitutional:       General: She is not in acute distress  Pulmonary:      Effort: Pulmonary effort is normal  No respiratory distress  Neurological:      Mental Status: She is alert and oriented to person, place, and time  Psychiatric:         Behavior: Behavior normal          Thought Content: Thought content normal          Judgment: Judgment normal           I spent 25 minutes with patient today in which greater than 50% of the time was spent in counseling/coordination of care regarding Reviewing previous labs  Management options  Discussing dietary changes  Patient encouraged to go for mammogram and labs  VIRTUAL VISIT DISCLAIMER      Shirley Garcia verbally agrees to participate in [employer]  Pt is aware that [employer] could be limited without vital signs or the ability to perform a full hands-on physical [place] understands she or the provider may request at any time to terminate the video visit and request the patient to seek care or treatment in person

## 2022-01-13 NOTE — ASSESSMENT & PLAN NOTE
Lab Results   Component Value Date    HGBA1C 8 6 (H) 10/07/2021     PATIENT DUE FOR LABS  Currently on metformin 500 b i d   Initial GI side effects have resolved now  Patient will be contacted with results    Recommended metabolic labs/follow-up at 4 months interval

## 2022-01-13 NOTE — ASSESSMENT & PLAN NOTE
Check x-ray to rule out arthritis    Patient will be contacted with the results, can start physical therapy thereafter

## 2022-07-11 ENCOUNTER — APPOINTMENT (OUTPATIENT)
Dept: LAB | Facility: CLINIC | Age: 52
End: 2022-07-11
Payer: COMMERCIAL

## 2022-07-11 DIAGNOSIS — E78.2 MIXED HYPERLIPIDEMIA: ICD-10-CM

## 2022-07-11 DIAGNOSIS — E11.9 CONTROLLED TYPE 2 DIABETES MELLITUS WITHOUT COMPLICATION, WITHOUT LONG-TERM CURRENT USE OF INSULIN (HCC): ICD-10-CM

## 2022-07-11 LAB
ALBUMIN SERPL BCP-MCNC: 3.5 G/DL (ref 3.5–5)
ALP SERPL-CCNC: 94 U/L (ref 46–116)
ALT SERPL W P-5'-P-CCNC: 77 U/L (ref 12–78)
ANION GAP SERPL CALCULATED.3IONS-SCNC: 6 MMOL/L (ref 4–13)
AST SERPL W P-5'-P-CCNC: 37 U/L (ref 5–45)
BILIRUB SERPL-MCNC: 0.59 MG/DL (ref 0.2–1)
BUN SERPL-MCNC: 18 MG/DL (ref 5–25)
CALCIUM SERPL-MCNC: 9.4 MG/DL (ref 8.3–10.1)
CHLORIDE SERPL-SCNC: 110 MMOL/L (ref 100–108)
CHOLEST SERPL-MCNC: 160 MG/DL
CO2 SERPL-SCNC: 24 MMOL/L (ref 21–32)
CREAT SERPL-MCNC: 0.9 MG/DL (ref 0.6–1.3)
EST. AVERAGE GLUCOSE BLD GHB EST-MCNC: 154 MG/DL
GFR SERPL CREATININE-BSD FRML MDRD: 73 ML/MIN/1.73SQ M
GLUCOSE P FAST SERPL-MCNC: 156 MG/DL (ref 65–99)
HBA1C MFR BLD: 7 %
HDLC SERPL-MCNC: 50 MG/DL
LDLC SERPL CALC-MCNC: 94 MG/DL (ref 0–100)
NONHDLC SERPL-MCNC: 110 MG/DL
POTASSIUM SERPL-SCNC: 4 MMOL/L (ref 3.5–5.3)
PROT SERPL-MCNC: 7.2 G/DL (ref 6.4–8.2)
SODIUM SERPL-SCNC: 140 MMOL/L (ref 136–145)
TRIGL SERPL-MCNC: 80 MG/DL

## 2022-07-11 PROCEDURE — 83036 HEMOGLOBIN GLYCOSYLATED A1C: CPT

## 2022-07-11 PROCEDURE — 80053 COMPREHEN METABOLIC PANEL: CPT

## 2022-07-11 PROCEDURE — 36415 COLL VENOUS BLD VENIPUNCTURE: CPT

## 2022-07-11 PROCEDURE — 80061 LIPID PANEL: CPT

## 2022-07-26 DIAGNOSIS — E11.9 CONTROLLED TYPE 2 DIABETES MELLITUS WITHOUT COMPLICATION, WITHOUT LONG-TERM CURRENT USE OF INSULIN (HCC): ICD-10-CM

## 2022-08-19 ENCOUNTER — RA CDI HCC (OUTPATIENT)
Dept: OTHER | Facility: HOSPITAL | Age: 52
End: 2022-08-19

## 2022-08-19 NOTE — PROGRESS NOTES
[facility]  coding opportunities       Chart reviewed, no opportunity found: CHART REVIEWED, NO OPPORTUNITY FOUND        Patients Insurance        Commercial Insurance: [employer]

## 2022-08-26 ENCOUNTER — OFFICE VISIT (OUTPATIENT)
Dept: FAMILY MEDICINE CLINIC | Facility: CLINIC | Age: 52
End: 2022-08-26
Payer: COMMERCIAL

## 2022-08-26 ENCOUNTER — APPOINTMENT (OUTPATIENT)
Dept: RADIOLOGY | Facility: CLINIC | Age: 52
End: 2022-08-26
Payer: COMMERCIAL

## 2022-08-26 VITALS
HEART RATE: 64 BPM | SYSTOLIC BLOOD PRESSURE: 128 MMHG | HEIGHT: 66 IN | BODY MASS INDEX: 31.66 KG/M2 | WEIGHT: 197 LBS | OXYGEN SATURATION: 98 % | DIASTOLIC BLOOD PRESSURE: 78 MMHG

## 2022-08-26 DIAGNOSIS — E11.9 CONTROLLED TYPE 2 DIABETES MELLITUS WITHOUT COMPLICATION, WITHOUT LONG-TERM CURRENT USE OF INSULIN (HCC): ICD-10-CM

## 2022-08-26 DIAGNOSIS — M25.561 CHRONIC PAIN OF RIGHT KNEE: ICD-10-CM

## 2022-08-26 DIAGNOSIS — G89.29 CHRONIC PAIN OF RIGHT KNEE: ICD-10-CM

## 2022-08-26 DIAGNOSIS — Z23 NEED FOR VACCINATION: Primary | ICD-10-CM

## 2022-08-26 DIAGNOSIS — E78.2 MIXED HYPERLIPIDEMIA: ICD-10-CM

## 2022-08-26 PROCEDURE — 73562 X-RAY EXAM OF KNEE 3: CPT

## 2022-08-26 PROCEDURE — 90677 PCV20 VACCINE IM: CPT | Performed by: FAMILY MEDICINE

## 2022-08-26 PROCEDURE — 90471 IMMUNIZATION ADMIN: CPT | Performed by: FAMILY MEDICINE

## 2022-08-26 PROCEDURE — 3725F SCREEN DEPRESSION PERFORMED: CPT | Performed by: FAMILY MEDICINE

## 2022-08-26 PROCEDURE — 99215 OFFICE O/P EST HI 40 MIN: CPT | Performed by: FAMILY MEDICINE

## 2022-08-26 NOTE — ASSESSMENT & PLAN NOTE
patient reports knee swelling, specially with certain postures/prolong standing  Recommended x-ray of the knee  Can take intermittent over-the-counter NSAIDs with caution

## 2022-08-26 NOTE — PROGRESS NOTES
Subjective:      Patient ID: Buster Pandya is a 46 y o  female  Diabetes  She presents for her follow-up diabetic visit  She has type 2 diabetes mellitus  Her disease course has been improving (A1c 7)  There are no hypoglycemic associated symptoms  There are no diabetic associated symptoms  Pertinent negatives for diabetes include no chest pain and no foot paresthesias  Symptoms are stable  Risk factors for coronary artery disease include diabetes mellitus, dyslipidemia and post-menopausal  Current diabetic treatment includes oral agent (monotherapy)  She is compliant with treatment all of the time  She participates in exercise daily  Eye exam is current  Hyperlipidemia  This is a chronic problem  The current episode started more than 1 year ago  The problem is controlled  Lipid results: LDL 93  Pertinent negatives include no chest pain, myalgias or shortness of breath  Current antihyperlipidemic treatment includes diet change  The current treatment provides mild improvement of lipids  There are no compliance problems  Knee Pain   The incident occurred more than 1 week ago  The incident occurred at home  There was no injury mechanism  The pain is present in the right knee  The quality of the pain is described as aching  The pain is at a severity of 6/10  The pain is moderate  The pain has been intermittent since onset  The symptoms are aggravated by weight bearing, movement and palpation  She has tried ice and NSAIDs for the symptoms  The treatment provided mild relief         Past Medical History:   Diagnosis Date    History of transfusion 2017    given due to Hgb at 6 6, no rx     (spontaneous vaginal delivery)     x4       Family History   Problem Relation Age of Onset    Lung cancer Father        Past Surgical History:   Procedure Laterality Date    [place] CYSTOURETHROSCOPY N/A 2018    Procedure: CYSTOSCOPY;  Surgeon: Lidia Toussaint MD;  [facility];  Service: Gynecology    [place] LAPAROSCOPY W TOT HYSTERECTUTERUS <=250 GRAM  W TUBE/OVARY N/A 1/8/2018    Procedure: TOTAL LAPAROSCOPIC HYSTERECTOMY; BILATERAL SALPINGECTOMY;  Surgeon: Eduardo Castañeda MD;  [facility];  Service: Gynecology    [place] TOTAL ABDOM HYSTERECTOMY N/A 1/8/2018    Procedure: TOTAL ABDOMINAL HYSTERECTOMY;  Surgeon: Eduardo Castañeda MD;  [facility];  Service: Gynecology    SALPINGECTOMY Bilateral 1/8/2018    Procedure: SALPINGECTOMY;  Surgeon: Eduardo Castañeda MD;  [facility];  Service: Gynecology    WISDOM TOOTH EXTRACTION          reports that she quit smoking about 14 years ago  She has never used smokeless tobacco  She reports current alcohol use of about 2 0 standard drinks of alcohol per week  She reports that she does not use drugs  Current Outpatient Medications:     metFORMIN (GLUCOPHAGE) 500 mg tablet, Take 1 tablet (500 mg total) by mouth 2 (two) times a day with meals, Disp: 180 tablet, Rfl: 1    The following portions of the patient's history were reviewed and updated as appropriate: allergies, current medications, past family history, past medical history, past social history, past surgical history and problem list     Review of Systems   Constitutional: Negative  Respiratory: Negative  Negative for shortness of breath  Cardiovascular: Negative  Negative for chest pain and palpitations  Musculoskeletal: Negative for myalgias  Rt knee swelling   Neurological: Negative  Psychiatric/Behavioral: Negative  Objective:    /78   Pulse 64   Ht 5' 6" (1 676 m)   Wt 89 4 kg (197 lb)   LMP 11/08/2017   SpO2 98%   BMI 31 80 kg/m²      Physical Exam  Constitutional:       Appearance: She is well-developed  Cardiovascular:      Rate and Rhythm: Normal rate and regular rhythm  Pulses: no weak pulses     Heart sounds: Normal heart sounds     Pulmonary:      Effort: Pulmonary effort is normal       Breath sounds: Normal breath sounds  Abdominal:      General: Bowel sounds are normal       Palpations: Abdomen is soft  Musculoskeletal:         General: No swelling or tenderness  Normal range of motion  Feet:      Right foot:      Skin integrity: No ulcer, skin breakdown, erythema, warmth, callus or dry skin  Left foot:      Skin integrity: No ulcer, skin breakdown, erythema, warmth, callus or dry skin  Neurological:      Mental Status: She is alert and oriented to person, place, and time  Psychiatric:         Behavior: Behavior normal          Judgment: Judgment normal            Patient's shoes and socks removed  Right Foot/Ankle   Right Foot Inspection  Skin Exam: skin normal and skin intact  No dry skin, no warmth, no callus, no erythema, no maceration, no abnormal color, no pre-ulcer, no ulcer and no callus  Toe Exam: ROM and strength within normal limits  Sensory   Vibration: intact  Proprioception: intact  Monofilament testing: intact    Vascular  Capillary refills: < 3 seconds          Left Foot/Ankle  Left Foot Inspection  Skin Exam: skin normal and skin intact  No dry skin, no warmth, no erythema, no maceration, normal color, no pre-ulcer, no ulcer and no callus  Toe Exam: ROM and strength within normal limits  Sensory   Vibration: intact  Proprioception: intact  Monofilament testing: intact    Vascular  Capillary refills: < 3 seconds        Assign Risk Category  No deformity present  No loss of protective sensation  No weak pulses  Risk: 0      No results found for this or any previous visit (from the past 1008 hour(s))  Assessment/Plan:    No problem-specific Assessment & Plan notes found for this encounter             Problem List Items Addressed This Visit        Endocrine    Controlled type 2 diabetes mellitus without complication, without long-term current use of insulin ([facility] )       Lab Results   Component Value Date    HGBA1C 7 0 (H) 07/11/2022     Patient's A1c has responded well to metformin 500 milligram b i d  Dropped from 8 6 to 7 0  Patient encouraged to continue with low carb diet/ exercise  Continue monitoring metabolic labs at 4 months interval          Relevant Medications    metFORMIN (GLUCOPHAGE) 500 mg tablet    Other Relevant Orders    Comprehensive metabolic panel    Hemoglobin A1C    Microalbumin / creatinine urine ratio       Other    Chronic pain of right knee      patient reports knee swelling, specially with certain postures/prolong standing  Recommended x-ray of the knee  Can take intermittent over-the-counter NSAIDs with caution  Relevant Orders    XR knee 3 vw right non injury    Hyperlipidemia     LDL continues to be borderline high  Recommended low-fat diet/monitoring  Offered statin but patient declines today           Relevant Orders    Lipid panel    Need for vaccination - Primary    Relevant Orders    Pneumococcal Conjugate Vaccine 20-valent (Pcv20)

## 2022-08-26 NOTE — ASSESSMENT & PLAN NOTE
Lab Results   Component Value Date    HGBA1C 7 0 (H) 07/11/2022     Patient's A1c has responded well to metformin 500 milligram b i d  Dropped from 8 6 to 7 0  Patient encouraged to continue with low carb diet/ exercise    Continue monitoring metabolic labs at 4 months interval

## 2022-08-26 NOTE — ASSESSMENT & PLAN NOTE
LDL continues to be borderline high  Recommended low-fat diet/monitoring  Offered statin but patient declines today

## 2022-11-18 ENCOUNTER — HOSPITAL ENCOUNTER (OUTPATIENT)
Dept: MAMMOGRAPHY | Facility: CLINIC | Age: 52
End: 2022-11-18

## 2022-11-18 VITALS — WEIGHT: 197 LBS | BODY MASS INDEX: 31.66 KG/M2 | HEIGHT: 66 IN

## 2022-11-18 DIAGNOSIS — Z12.31 VISIT FOR SCREENING MAMMOGRAM: ICD-10-CM

## 2022-11-28 ENCOUNTER — TELEPHONE (OUTPATIENT)
Dept: FAMILY MEDICINE CLINIC | Facility: CLINIC | Age: 52
End: 2022-11-28

## 2022-11-28 NOTE — TELEPHONE ENCOUNTER
----- Message from Clint Garcia MD sent at 11/27/2022  6:26 PM EST -----  Please call patient with normal results

## 2023-01-17 ENCOUNTER — RA CDI HCC (OUTPATIENT)
Dept: OTHER | Facility: HOSPITAL | Age: 53
End: 2023-01-17

## 2023-01-17 NOTE — PROGRESS NOTES
[facility]  coding opportunities       Chart reviewed, no opportunity found: CHART REVIEWED, NO OPPORTUNITY FOUND        Patients Insurance        Commercial Insurance: [employer]

## 2023-08-08 ENCOUNTER — HOSPITAL ENCOUNTER (EMERGENCY)
Facility: HOSPITAL | Age: 53
Discharge: HOME | End: 2023-08-08
Attending: EMERGENCY MEDICINE
Payer: COMMERCIAL

## 2023-08-08 VITALS
OXYGEN SATURATION: 99 % | RESPIRATION RATE: 18 BRPM | DIASTOLIC BLOOD PRESSURE: 79 MMHG | SYSTOLIC BLOOD PRESSURE: 144 MMHG | HEART RATE: 71 BPM | TEMPERATURE: 98.1 F

## 2023-08-08 DIAGNOSIS — L08.9 INFECTED INSECT BITE OF RIGHT UPPER EXTREMITY, INITIAL ENCOUNTER: Primary | ICD-10-CM

## 2023-08-08 DIAGNOSIS — L03.113 RIGHT ARM CELLULITIS: ICD-10-CM

## 2023-08-08 DIAGNOSIS — S40.861A INFECTED INSECT BITE OF RIGHT UPPER EXTREMITY, INITIAL ENCOUNTER: Primary | ICD-10-CM
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
## 2023-08-08 PROCEDURE — 99282 EMERGENCY DEPT VISIT SF MDM: CPT

## 2023-08-08 RX ORDER — DIPHENHYDRAMINE HYDROCHLORIDE, ZINC ACETATE 2; .1 G/100G; G/100G
CREAM TOPICAL 3 TIMES DAILY PRN
Status: DISCONTINUED | OUTPATIENT
Start: 2023-08-08 | End: 2023-08-09 | Stop reason: HOSPADM

## 2023-08-08 RX ORDER — SULFAMETHOXAZOLE AND TRIMETHOPRIM 800; 160 MG/1; MG/1
1 TABLET ORAL ONCE
Status: COMPLETED | OUTPATIENT
Start: 2023-08-08 | End: 2023-08-08

## 2023-08-08 RX ORDER — SULFAMETHOXAZOLE AND TRIMETHOPRIM 800; 160 MG/1; MG/1
1 TABLET ORAL 2 TIMES DAILY
Qty: 14 TABLET | Refills: 0 | Status: SHIPPED | OUTPATIENT
Start: 2023-08-08 | End: 2023-08-15

## 2023-08-08 RX ADMIN — SULFAMETHOXAZOLE AND TRIMETHOPRIM 1 TABLET: 800; 160 TABLET ORAL at 22:09

## 2023-08-09 NOTE — ED NOTES
Discharge instructions reviewed with pt. Pt verbalized understanding. And has no further questions at this time. Pt ambulatory off unit with steady gait.       Raina Pickett RN  08/08/23 1086

## 2023-08-09 NOTE — ED PROVIDER NOTES
History  Chief Complaint   Patient presents with   • Insect Bite     Pt presents to the ed after getting bit by an insect on , reports swelling since , reports itching now, denies pain, no meds pta      The patient reports that on  she felt a pinch in her arm that felt like she was stung. She had swelling then in the area where she was stung. She identifies it in the medial right arm just proximal to her elbow. She reports since then the swelling has increased. She now has redness and a large area surrounding it. She does not have fevers or chills. She reports very mild itching and the outer part of it. She also reports an uncomfortable feeling that feels like stretching from having swelling. She reports that she would describe it more as a bother than a true pain. She took an oral Benadryl but apart from that has not tried anything else for this. Prior to Admission Medications   Prescriptions Last Dose Informant Patient Reported?  Taking?   metFORMIN (GLUCOPHAGE) 500 mg tablet   No No   Sig: TAKE 1 TABLET BY MOUTH TWICE A DAY WITH MEALS      Facility-Administered Medications: None       Past Medical History:   Diagnosis Date   • History of transfusion 2017    given due to Hgb at 6.6, no rx   •  (spontaneous vaginal delivery)     x4       Past Surgical History:   Procedure Laterality Date   • HYSTERECTOMY     • TX CYSTOURETHROSCOPY N/A 2018    Procedure: Bryan Housers;  Surgeon: Elpidio Simons MD;  [facility];  Service: Gynecology   • TX LAPS TOTAL HYSTERECT 250 GM/< W/RMVL TUBE/OVARY N/A 2018    Procedure: TOTAL LAPAROSCOPIC HYSTERECTOMY; BILATERAL SALPINGECTOMY;  Surgeon: Elpidio Simons MD;  [facility];  Service: Gynecology   • TX TOTAL ABDOMINAL HYSTERECT W/WO RMVL TUBE OVARY N/A 2018    Procedure: TOTAL ABDOMINAL HYSTERECTOMY;  Surgeon: Elpidio Simons MD;  [facility];  Service: Gynecology   • SALPINGECTOMY Bilateral 01/08/2018    Procedure: SALPINGECTOMY;  Surgeon: Shilpi León MD;  [facility];  Service: Gynecology   • WISDOM TOOTH EXTRACTION         Family History   Problem Relation Age of Onset   • No Known Problems Mother    • Lung cancer Father    • No Known Problems Sister    • No Known Problems Daughter    • No Known Problems Maternal Grandmother    • No Known Problems Maternal Grandfather    • No Known Problems Paternal Grandmother    • No Known Problems Paternal Grandfather      I have reviewed and agree with the history as documented. E-Cigarette/Vaping   • E-Cigarette Use Never User      E-Cigarette/Vaping Substances     Social History     Tobacco Use   • Smoking status: Former     Types: Cigarettes     Quit date: 2008     Years since quitting: 15.6   • Smokeless tobacco: Never   Vaping Use   • Vaping Use: Never used   Substance Use Topics   • Alcohol use: Yes     Alcohol/week: 2.0 standard drinks of alcohol     Types: 2 Glasses of wine per week     Comment: social   • Drug use: No       Review of Systems   All other systems reviewed and are negative. Physical Exam  Physical Exam  Vitals and nursing note reviewed. Constitutional:       Appearance: She is normal weight. HENT:      Head: Normocephalic and atraumatic. Nose: Nose normal.      Mouth/Throat:      Mouth: Mucous membranes are moist.   Eyes:      Conjunctiva/sclera: Conjunctivae normal.   Pulmonary:      Effort: Pulmonary effort is normal.   Musculoskeletal:         General: Normal range of motion. Cervical back: Normal range of motion. Skin:     General: Skin is warm and dry. Comments: 2 cm indurated, nonfluctuant erythematous swelling in medial right arm with surrounding circular erythema approximately 15 cm in diameter   Neurological:      General: No focal deficit present. Mental Status: She is alert.    Psychiatric:         Mood and Affect: Mood normal.         Vital Signs  ED Triage Vitals [08/08/23 2132]   Temperature Pulse Respirations Blood Pressure SpO2   98.1 °F (36.7 °C) 71 18 144/79 99 %      Temp Source Heart Rate Source Patient Position - Orthostatic VS BP Location FiO2 (%)   Oral Monitor Sitting Right arm --      Pain Score       --           Vitals:    08/08/23 2132   BP: 144/79   Pulse: 71   Patient Position - Orthostatic VS: Sitting         Visual Acuity      ED Medications  Medications   diphenhydrAMINE-zinc acetate (BENADRYL) 2-0.1 % cream (has no administration in time range)   sulfamethoxazole-trimethoprim (BACTRIM DS) 800-160 mg per tablet 1 tablet (1 tablet Oral Given 8/8/23 2209)       Diagnostic Studies  Results Reviewed     None                 No orders to display              Procedures  Procedures         ED Course  ED Course as of 08/08/23 2312   Tue Aug 08, 2023   2312 I discussed treatment options, indications return to the emergency department, and risk for treatment failure. SBIRT 20yo+    Flowsheet Row Most Recent Value   Initial Alcohol Screen: US AUDIT-C     1. How often do you have a drink containing alcohol? 0 Filed at: 08/08/2023 2132   2. How many drinks containing alcohol do you have on a typical day you are drinking? 0 Filed at: 08/08/2023 2132   3a. Male UNDER 65: How often do you have five or more drinks on one occasion? 0 Filed at: 08/08/2023 2132   3b. FEMALE Any Age, or MALE 65+: How often do you have 4 or more drinks on one occassion? 0 Filed at: 08/08/2023 2132   Audit-C Score 0 Filed at: 08/08/2023 2132   ANTHONY: How many times in the past year have you. .. Used an illegal drug or used a prescription medication for non-medical reasons? Never Filed at: 08/08/2023 2132                    Medical Decision Making  Differential includes envenomation reaction, cellulitis, developing abscess    Risk  OTC drugs. Prescription drug management.           Disposition  Final diagnoses:   Infected insect bite of right upper extremity, initial encounter   Right arm cellulitis     Time reflects when diagnosis was documented in both MDM as applicable and the Disposition within this note     Time User Action Codes Description Comment    8/8/2023  9:45 PM Louisa Novoa Add [U38.829M,  L08.9,  W57. XXXA] Infected insect bite of right upper extremity, initial encounter     8/8/2023  9:45 PM Louisa Novoa Add [N83.842] Right arm cellulitis       ED Disposition     ED Disposition   Discharge    Condition   Stable    Date/Time   Tue Aug 8, 2023  9:52 PM    Comment   Pepe Como discharge to home/self care. Follow-up Information     Follow up With Specialties Details Why Contact Info Additional Information    Adria Ayala MD Family Medicine In 2 days  [address]  [facility] [address] [address] [address] Emergency Department Emergency Medicine  If symptoms worsen [address] [id number]  [address] [facility] Emergency Department, [address] [place] [address], [address]          Discharge Medication List as of 8/8/2023 10:12 PM      START taking these medications    Details   sulfamethoxazole-trimethoprim (BACTRIM DS) 800-160 mg per tablet Take 1 tablet by mouth 2 (two) times a day for 7 days smx-tmp DS (BACTRIM) 800-160 mg tabs (1tab q12 D10), Starting Tue 8/8/2023, Until Tue 8/15/2023, Normal         CONTINUE these medications which have NOT CHANGED    Details   metFORMIN (GLUCOPHAGE) 500 mg tablet TAKE 1 TABLET BY MOUTH TWICE A DAY WITH MEALS, Normal             No discharge procedures on file.     PDMP Review     None          ED Provider  Electronically Signed by           Marixa Laboy MD  08/08/23 3759

## 2023-08-14 ENCOUNTER — APPOINTMENT (OUTPATIENT)
Dept: LAB | Facility: CLINIC | Age: 53
End: 2023-08-14
Payer: COMMERCIAL

## 2023-08-14 DIAGNOSIS — E78.2 MIXED HYPERLIPIDEMIA: ICD-10-CM

## 2023-08-14 DIAGNOSIS — E11.9 CONTROLLED TYPE 2 DIABETES MELLITUS WITHOUT COMPLICATION, WITHOUT LONG-TERM CURRENT USE OF INSULIN (HCC): ICD-10-CM

## 2023-08-14 LAB
ALBUMIN SERPL BCP-MCNC: 4 G/DL (ref 3.5–5)
ALP SERPL-CCNC: 99 U/L (ref 46–116)
ALT SERPL W P-5'-P-CCNC: 34 U/L (ref 12–78)
ANION GAP SERPL CALCULATED.3IONS-SCNC: 3 MMOL/L
AST SERPL W P-5'-P-CCNC: 14 U/L (ref 5–45)
BILIRUB SERPL-MCNC: 0.69 MG/DL (ref 0.2–1)
BUN SERPL-MCNC: 11 MG/DL (ref 5–25)
CALCIUM SERPL-MCNC: 9.6 MG/DL (ref 8.3–10.1)
CHLORIDE SERPL-SCNC: 109 MMOL/L (ref 96–108)
CHOLEST SERPL-MCNC: 167 MG/DL
CO2 SERPL-SCNC: 26 MMOL/L (ref 21–32)
CREAT SERPL-MCNC: 1 MG/DL (ref 0.6–1.3)
CREAT UR-MCNC: 136 MG/DL
EST. AVERAGE GLUCOSE BLD GHB EST-MCNC: 146 MG/DL
GFR SERPL CREATININE-BSD FRML MDRD: 64 ML/MIN/1.73SQ M
GLUCOSE P FAST SERPL-MCNC: 114 MG/DL (ref 65–99)
HBA1C MFR BLD: 6.7 %
HDLC SERPL-MCNC: 51 MG/DL
LDLC SERPL CALC-MCNC: 91 MG/DL (ref 0–100)
MICROALBUMIN UR-MCNC: 7.2 MG/L (ref 0–20)
MICROALBUMIN/CREAT 24H UR: 5 MG/G CREATININE (ref 0–30)
NONHDLC SERPL-MCNC: 116 MG/DL
POTASSIUM SERPL-SCNC: 4.2 MMOL/L (ref 3.5–5.3)
PROT SERPL-MCNC: 7.9 G/DL (ref 6.4–8.4)
SODIUM SERPL-SCNC: 138 MMOL/L (ref 135–147)
TRIGL SERPL-MCNC: 127 MG/DL

## 2023-08-14 PROCEDURE — 82043 UR ALBUMIN QUANTITATIVE: CPT

## 2023-08-14 PROCEDURE — 83036 HEMOGLOBIN GLYCOSYLATED A1C: CPT

## 2023-08-14 PROCEDURE — 82570 ASSAY OF URINE CREATININE: CPT

## 2023-08-14 PROCEDURE — 36415 COLL VENOUS BLD VENIPUNCTURE: CPT

## 2023-08-14 PROCEDURE — 80053 COMPREHEN METABOLIC PANEL: CPT

## 2023-08-14 PROCEDURE — 80061 LIPID PANEL: CPT

## 2023-08-18 ENCOUNTER — OFFICE VISIT (OUTPATIENT)
Dept: FAMILY MEDICINE CLINIC | Facility: CLINIC | Age: 53
End: 2023-08-18
Payer: COMMERCIAL

## 2023-08-18 VITALS
HEART RATE: 75 BPM | HEIGHT: 66 IN | BODY MASS INDEX: 31.02 KG/M2 | OXYGEN SATURATION: 99 % | DIASTOLIC BLOOD PRESSURE: 78 MMHG | SYSTOLIC BLOOD PRESSURE: 122 MMHG | WEIGHT: 193 LBS

## 2023-08-18 DIAGNOSIS — E11.9 CONTROLLED TYPE 2 DIABETES MELLITUS WITHOUT COMPLICATION, WITHOUT LONG-TERM CURRENT USE OF INSULIN (HCC): Primary | ICD-10-CM

## 2023-08-18 DIAGNOSIS — Z12.31 VISIT FOR SCREENING MAMMOGRAM: ICD-10-CM

## 2023-08-18 DIAGNOSIS — E78.2 MIXED HYPERLIPIDEMIA: ICD-10-CM

## 2023-08-18 DIAGNOSIS — Z12.11 SCREEN FOR COLON CANCER: ICD-10-CM

## 2023-08-18 PROCEDURE — 99213 OFFICE O/P EST LOW 20 MIN: CPT | Performed by: FAMILY MEDICINE

## 2023-08-18 NOTE — ASSESSMENT & PLAN NOTE
Lab Results   Component Value Date    HGBA1C 6.7 (H) 08/14/2023     Patient's A1c has responded well to metformin 500 milligram b.i.d.. Dropped from  7.0 to 6.7.. Patient encouraged to continue with low carb diet/ exercise.   Continue monitoring metabolic labs at 4 months interval.

## 2023-08-18 NOTE — PROGRESS NOTES
Subjective:      Patient ID: Mehran Mosley is a 48 y.o. female. Diabetes  She presents for her follow-up diabetic visit. She has type 2 diabetes mellitus. Her disease course has been stable. There are no diabetic associated symptoms. Risk factors for coronary artery disease include dyslipidemia, diabetes mellitus and post-menopausal. Current diabetic treatment includes oral agent (monotherapy). She is compliant with treatment all of the time. Labs reviewed with patient today. LDL 91, Currently not on statin. Past Medical History:   Diagnosis Date   • History of transfusion 2017    given due to Hgb at 6.6, no rx   •  (spontaneous vaginal delivery)     x4       Family History   Problem Relation Age of Onset   • No Known Problems Mother    • Lung cancer Father    • No Known Problems Sister    • No Known Problems Daughter    • No Known Problems Maternal Grandmother    • No Known Problems Maternal Grandfather    • No Known Problems Paternal Grandmother    • No Known Problems Paternal Grandfather        Past Surgical History:   Procedure Laterality Date   • HYSTERECTOMY     • OR CYSTOURETHROSCOPY N/A 2018    Procedure: Alysia Martinez;  Surgeon: Arnulfo Munroe MD;  [facility];  Service: Gynecology   • OR LAPS TOTAL HYSTERECT 250 GM/< W/RMVL TUBE/OVARY N/A 2018    Procedure: TOTAL LAPAROSCOPIC HYSTERECTOMY; BILATERAL SALPINGECTOMY;  Surgeon: Arnulfo Munroe MD;  [facility];  Service: Gynecology   • OR TOTAL ABDOMINAL HYSTERECT W/WO RMVL TUBE OVARY N/A 2018    Procedure: TOTAL ABDOMINAL HYSTERECTOMY;  Surgeon: Arnulfo Munroe MD;  [facility];  Service: Gynecology   • SALPINGECTOMY Bilateral 2018    Procedure: SALPINGECTOMY;  Surgeon: Arnulfo Munroe MD;  [facility];  Service: Gynecology   • WISDOM TOOTH EXTRACTION          reports that she quit smoking about 15 years ago.  She has never used smokeless tobacco. She reports Collection Time: 08/14/23  9:38 AM   Result Value Ref Range    Cholesterol 167 See Comment mg/dL    Triglycerides 127 See Comment mg/dL    HDL, Direct 51 >=50 mg/dL    LDL Calculated 91 0 - 100 mg/dL    Non-HDL-Chol (CHOL-HDL) 116 mg/dl   Microalbumin / creatinine urine ratio    Collection Time: 08/14/23  9:38 AM   Result Value Ref Range    Creatinine, Ur 136.0 mg/dL    Albumin,U,Random 7.2 0.0 - 20.0 mg/L    Albumin Creat Ratio 5 0 - 30 mg/g creatinine       Assessment/Plan:    No problem-specific Assessment & Plan notes found for this encounter. Problem List Items Addressed This Visit        Endocrine    Controlled type 2 diabetes mellitus without complication, without long-term current use of insulin ([address]) - Primary       Lab Results   Component Value Date    HGBA1C 6.7 (H) 08/14/2023     Patient's A1c has responded well to metformin 500 milligram b.i.d.. Dropped from  7.0 to 6.7.. Patient encouraged to continue with low carb diet/ exercise. Continue monitoring metabolic labs at 4 months interval.         Relevant Medications    metFORMIN (GLUCOPHAGE) 500 mg tablet    Other Relevant Orders    IRIS Diabetic eye exam    Comprehensive metabolic panel    Hemoglobin A1C       Other    Visit for screening mammogram    Relevant Orders    Mammo screening bilateral w 3d & cad    Hyperlipidemia     LDL continues to be borderline high. Recommended low-fat diet/monitoring. Offered statin but patient declines today.          Relevant Orders    Lipid panel   Other Visit Diagnoses     Screen for colon cancer        Relevant Orders    Cologuard

## 2023-08-19 LAB
LEFT EYE DIABETIC RETINOPATHY: NORMAL
LEFT EYE IMAGE QUALITY: NORMAL
LEFT EYE MACULAR EDEMA: NORMAL
LEFT EYE OTHER RETINOPATHY: NORMAL
RIGHT EYE DIABETIC RETINOPATHY: NORMAL
RIGHT EYE IMAGE QUALITY: NORMAL
RIGHT EYE MACULAR EDEMA: NORMAL
RIGHT EYE OTHER RETINOPATHY: NORMAL
SEVERITY (EYE EXAM): NORMAL

## 2023-08-21 ENCOUNTER — TELEPHONE (OUTPATIENT)
Dept: FAMILY MEDICINE CLINIC | Facility: CLINIC | Age: 53
End: 2023-08-21

## 2023-08-21 NOTE — TELEPHONE ENCOUNTER
----- Message from Karen Negron MD sent at 8/18/2023 12:29 PM EDT -----  Please call patient with normal results.

## 2023-08-30 ENCOUNTER — TELEPHONE (OUTPATIENT)
Dept: FAMILY MEDICINE CLINIC | Facility: CLINIC | Age: 53
End: 2023-08-30

## 2023-08-30 LAB — COLOGUARD RESULT REPORTABLE: NEGATIVE

## 2023-08-30 NOTE — TELEPHONE ENCOUNTER
----- Message from Andrew Haro MD sent at 8/30/2023  4:41 PM EDT -----  Please call patient with normal results.

## 2023-08-30 NOTE — TELEPHONE ENCOUNTER
----- Message from Maris Jacinto MD sent at 8/30/2023  4:41 PM EDT -----  Please call patient with normal results.

## 2024-01-15 ENCOUNTER — APPOINTMENT (OUTPATIENT)
Dept: LAB | Facility: CLINIC | Age: 54
End: 2024-01-15
Payer: COMMERCIAL

## 2024-01-15 ENCOUNTER — HOSPITAL ENCOUNTER (OUTPATIENT)
Dept: MAMMOGRAPHY | Facility: HOSPITAL | Age: 54
Discharge: HOME | End: 2024-01-15
Payer: COMMERCIAL

## 2024-01-15 VITALS — HEIGHT: 66 IN | BODY MASS INDEX: 31.02 KG/M2 | WEIGHT: 193 LBS

## 2024-01-15 DIAGNOSIS — E78.2 MIXED HYPERLIPIDEMIA: ICD-10-CM

## 2024-01-15 DIAGNOSIS — E11.9 CONTROLLED TYPE 2 DIABETES MELLITUS WITHOUT COMPLICATION, WITHOUT LONG-TERM CURRENT USE OF INSULIN (HCC): ICD-10-CM

## 2024-01-15 DIAGNOSIS — Z12.31 VISIT FOR SCREENING MAMMOGRAM: ICD-10-CM

## 2024-01-15 LAB
ALBUMIN SERPL BCP-MCNC: 4.4 G/DL (ref 3.5–5)
ALP SERPL-CCNC: 79 U/L (ref 34–104)
ALT SERPL W P-5'-P-CCNC: 32 U/L (ref 7–52)
ANION GAP SERPL CALCULATED.3IONS-SCNC: 8 MMOL/L
AST SERPL W P-5'-P-CCNC: 23 U/L (ref 13–39)
BILIRUB SERPL-MCNC: 0.56 MG/DL (ref 0.2–1)
BUN SERPL-MCNC: 13 MG/DL (ref 5–25)
CALCIUM SERPL-MCNC: 9.9 MG/DL (ref 8.4–10.2)
CHLORIDE SERPL-SCNC: 105 MMOL/L (ref 96–108)
CHOLEST SERPL-MCNC: 161 MG/DL
CO2 SERPL-SCNC: 26 MMOL/L (ref 21–32)
CREAT SERPL-MCNC: 0.78 MG/DL (ref 0.6–1.3)
EST. AVERAGE GLUCOSE BLD GHB EST-MCNC: 160 MG/DL
GFR SERPL CREATININE-BSD FRML MDRD: 87 ML/MIN/1.73SQ M
GLUCOSE P FAST SERPL-MCNC: 160 MG/DL (ref 65–99)
HBA1C MFR BLD: 7.2 %
HDLC SERPL-MCNC: 48 MG/DL
LDLC SERPL CALC-MCNC: 98 MG/DL (ref 0–100)
NONHDLC SERPL-MCNC: 113 MG/DL
POTASSIUM SERPL-SCNC: 4.3 MMOL/L (ref 3.5–5.3)
PROT SERPL-MCNC: 7.2 G/DL (ref 6.4–8.4)
SODIUM SERPL-SCNC: 139 MMOL/L (ref 135–147)
TRIGL SERPL-MCNC: 77 MG/DL

## 2024-01-15 PROCEDURE — 83036 HEMOGLOBIN GLYCOSYLATED A1C: CPT

## 2024-01-15 PROCEDURE — 77063 BREAST TOMOSYNTHESIS BI: CPT

## 2024-01-15 PROCEDURE — 77067 SCR MAMMO BI INCL CAD: CPT

## 2024-01-15 PROCEDURE — 80061 LIPID PANEL: CPT

## 2024-01-15 PROCEDURE — 36415 COLL VENOUS BLD VENIPUNCTURE: CPT

## 2024-01-15 PROCEDURE — 80053 COMPREHEN METABOLIC PANEL: CPT

## 2024-01-16 ENCOUNTER — TELEPHONE (OUTPATIENT)
Dept: FAMILY MEDICINE CLINIC | Facility: CLINIC | Age: 54
End: 2024-01-16

## 2024-01-16 NOTE — TELEPHONE ENCOUNTER
----- Message from Kasia Mora MD sent at 1/16/2024 12:05 PM EST -----  Please call patient with normal results.

## 2024-01-19 ENCOUNTER — TELEMEDICINE (OUTPATIENT)
Dept: FAMILY MEDICINE CLINIC | Facility: CLINIC | Age: 54
End: 2024-01-19

## 2024-01-19 VITALS — HEIGHT: 66 IN | WEIGHT: 194 LBS | BODY MASS INDEX: 31.18 KG/M2

## 2024-01-19 DIAGNOSIS — E11.9 CONTROLLED TYPE 2 DIABETES MELLITUS WITHOUT COMPLICATION, WITHOUT LONG-TERM CURRENT USE OF INSULIN (HCC): Primary | ICD-10-CM

## 2024-01-19 DIAGNOSIS — E78.2 MIXED HYPERLIPIDEMIA: ICD-10-CM

## 2024-01-19 NOTE — ASSESSMENT & PLAN NOTE
LDL continues to be borderline high, 93.  Recommended low-fat diet/monitoring.  Offered statin but patient declines today.

## 2024-01-19 NOTE — ASSESSMENT & PLAN NOTE
Lab Results   Component Value Date    HGBA1C 7.2 (H) 01/15/2024     Patient noted to have elevated fasting blood sugar.  A1c 7.2.  She is currently on metformin 500 mg twice daily, offered to increase the dose to thousand twice daily however patient declines.  Patient would like to continue with low-carb diet/exercise.  Can repeat labs at 3 months interval to ensure improvement.

## 2024-01-19 NOTE — PROGRESS NOTES
Virtual Regular Visit    Verification of patient location:    Patient is located at Home in the following state in which I hold an active license [place]      Assessment/Plan:    Problem List Items Addressed This Visit          Endocrine    Controlled type 2 diabetes mellitus without complication, without long-term current use of insulin ([facility]) - Primary       Lab Results   Component Value Date    HGBA1C 7.2 (H) 01/15/2024     Patient noted to have elevated fasting blood sugar.  A1c 7.2.  She is currently on metformin 500 mg twice daily, offered to increase the dose to thousand twice daily however patient declines.  Patient would like to continue with low-carb diet/exercise.  Can repeat labs at 3 months interval to ensure improvement.          Relevant Medications    metFORMIN (GLUCOPHAGE) 500 mg tablet    Other Relevant Orders    Comprehensive metabolic panel    Hemoglobin A1c (w/out EAG) ([facility] ONLY)    Microalbumin, Random Urine (W/Creatinine) ([facility] ONLY)       Other    Hyperlipidemia     LDL continues to be borderline high, 93.  Recommended low-fat diet/monitoring.  Offered statin but patient declines today.         Relevant Orders    Lipid Panel with Direct LDL reflex            Reason for visit is   Chief Complaint   Patient presents with    Follow-up     Review labs    Virtual Regular Visit          Encounter provider Kasia Mora MD    Provider located at [address] [place] [phone]      Recent Visits  Date Type Provider Dept   01/16/24 Telephone Cecilia Gregg   Showing recent visits within past 7 days and meeting all other requirements  Today's Visits  Date Type Provider Dept   01/19/24 Telemedicine MD Chad Marin   Showing today's visits and meeting all other requirements  Future Appointments  No visits were found meeting these conditions.  Showing future appointments within next 150 days and meeting all other  requirements       The patient was identified by name and date of birth. Armida Morrow was informed that this is a telemedicine visit and that the visit is being conducted through the [employer] platform. She agrees to proceed..  My office door was closed. No one else was in the room.  She acknowledged consent and understanding of privacy and security of the video platform. The patient has agreed to participate and understands they can discontinue the visit at any time.    Patient is aware this is a billable service.     Subjective  Armida Morrow is a 53 y.o. female  .      HPI     She presents for her follow-up diabetic visit. She has type 2 diabetes mellitus. Her disease course has been stable. There are no diabetic associated symptoms. Risk factors for coronary artery disease include dyslipidemia, diabetes mellitus and post-menopausal. Current diabetic treatment includes oral agent (monotherapy). She is compliant with treatment all of the time.   Labs reviewed with patient today.   LDL 93, Currently not on statin.    Past Medical History:   Diagnosis Date    History of transfusion 2017    given due to Hgb at 6.6, no rx     (spontaneous vaginal delivery)     x4       Past Surgical History:   Procedure Laterality Date    HYSTERECTOMY      NE CYSTOURETHROSCOPY N/A 2018    Procedure: CYSTOSCOPY;  Surgeon: Ray Adkins MD;  [facility];  Service: Gynecology    NE LAPS TOTAL HYSTERECT 250 GM/< W/RMVL TUBE/OVARY N/A 2018    Procedure: TOTAL LAPAROSCOPIC HYSTERECTOMY; BILATERAL SALPINGECTOMY;  Surgeon: Ray Adkins MD;  [facility];  Service: Gynecology    NE TOTAL ABDOMINAL HYSTERECT W/WO RMVL TUBE OVARY N/A 2018    Procedure: TOTAL ABDOMINAL HYSTERECTOMY;  Surgeon: Ray Adkins MD;  [facility];  Service: Gynecology    SALPINGECTOMY Bilateral 2018    Procedure: SALPINGECTOMY;  Surgeon: Ray Adkins MD;  [facility];   "Service: Gynecology    WISDOM TOOTH EXTRACTION         Current Outpatient Medications   Medication Sig Dispense Refill    metFORMIN (GLUCOPHAGE) 500 mg tablet Take 1 tablet (500 mg total) by mouth 2 (two) times a day with meals 180 tablet 1     No current facility-administered medications for this visit.        Allergies   Allergen Reactions    Pollen Extract Allergic Rhinitis       Review of Systems   Constitutional: Negative.    Respiratory: Negative.     Cardiovascular: Negative.        Video Exam    Vitals:    01/19/24 0854   Weight: 88 kg (194 lb)   Height: 5' 6\" (1.676 m)       Physical Exam  Constitutional:       Appearance: Normal appearance.   Pulmonary:      Effort: No respiratory distress.   Neurological:      Mental Status: She is oriented to person, place, and time.   Psychiatric:         Mood and Affect: Mood normal.          Visit Time  Total Visit Duration: 15        "

## 2024-02-27 ENCOUNTER — HOSPITAL ENCOUNTER (EMERGENCY)
Facility: HOSPITAL | Age: 54
Discharge: HOME | End: 2024-02-28
Attending: EMERGENCY MEDICINE | Admitting: EMERGENCY MEDICINE
Payer: COMMERCIAL

## 2024-02-27 VITALS
TEMPERATURE: 98.5 F | SYSTOLIC BLOOD PRESSURE: 154 MMHG | OXYGEN SATURATION: 97 % | HEART RATE: 75 BPM | DIASTOLIC BLOOD PRESSURE: 97 MMHG | RESPIRATION RATE: 20 BRPM

## 2024-02-27 DIAGNOSIS — J06.9 VIRAL URI WITH COUGH: ICD-10-CM

## 2024-02-27 DIAGNOSIS — J02.9 VIRAL PHARYNGITIS: Primary | ICD-10-CM

## 2024-02-27 PROCEDURE — 0241U HB NFCT DS VIR RESP RNA 4 TRGT: CPT

## 2024-02-27 PROCEDURE — 99282 EMERGENCY DEPT VISIT SF MDM: CPT

## 2024-02-27 PROCEDURE — 87651 STREP A DNA AMP PROBE: CPT

## 2024-02-27 PROCEDURE — 99284 EMERGENCY DEPT VISIT MOD MDM: CPT | Performed by: EMERGENCY MEDICINE

## 2024-02-27 RX ORDER — IBUPROFEN 600 MG/1
600 TABLET ORAL ONCE
Status: COMPLETED | OUTPATIENT
Start: 2024-02-28 | End: 2024-02-28

## 2024-02-27 RX ORDER — ACETAMINOPHEN 325 MG/1
975 TABLET ORAL ONCE
Status: COMPLETED | OUTPATIENT
Start: 2024-02-28 | End: 2024-02-28

## 2024-02-28 LAB
FLUAV RNA RESP QL NAA+PROBE: NEGATIVE
FLUBV RNA RESP QL NAA+PROBE: NEGATIVE
RSV RNA RESP QL NAA+PROBE: NEGATIVE
S PYO DNA THROAT QL NAA+PROBE: NOT DETECTED
SARS-COV-2 RNA RESP QL NAA+PROBE: NEGATIVE

## 2024-02-28 RX ADMIN — IBUPROFEN 600 MG: 600 TABLET, FILM COATED ORAL at 00:13

## 2024-02-28 RX ADMIN — ACETAMINOPHEN 975 MG: 325 TABLET, FILM COATED ORAL at 00:13

## 2024-02-28 RX ADMIN — DEXAMETHASONE SODIUM PHOSPHATE 10 MG: 10 INJECTION, SOLUTION INTRAMUSCULAR; INTRAVENOUS at 00:13

## 2024-02-28 NOTE — ED PROVIDER NOTES
History  Chief Complaint   Patient presents with    Sore Throat     Pt reports sore throat x1 week. Pt reports cough, nasal congestion, chills. Denies fevers, n/v     55 y/o female presents to the ED for evaluation of sore throat, nonproductive cough, nasal congestion, and chills.  She states that she started experiencing nasal congestion and a sore throat a week ago and her symptoms have persisted, subsequently developing a nonproductive cough and chills over the last 1 to 2 days.  She reports a positive sick contact via a child family member who had a recent cough but tested negative for COVID/influenza/RSV.  She denies any documented fevers, chest pain, dyspnea, wheezing, abdominal pain, nausea, vomiting, diarrhea, or urinary symptoms.  She did not take any medications today for her symptoms.        Prior to Admission Medications   Prescriptions Last Dose Informant Patient Reported? Taking?   metFORMIN (GLUCOPHAGE) 500 mg tablet   No No   Sig: Take 1 tablet (500 mg total) by mouth 2 (two) times a day with meals      Facility-Administered Medications: None       Past Medical History:   Diagnosis Date    History of transfusion 2017    given due to Hgb at 6.6, no rx     (spontaneous vaginal delivery)     x4       Past Surgical History:   Procedure Laterality Date    HYSTERECTOMY      TX CYSTOURETHROSCOPY N/A 2018    Procedure: CYSTOSCOPY;  Surgeon: Ray Adkins MD;  [facility];  Service: Gynecology    TX LAPS TOTAL HYSTERECT 250 GM/< W/RMVL TUBE/OVARY N/A 2018    Procedure: TOTAL LAPAROSCOPIC HYSTERECTOMY; BILATERAL SALPINGECTOMY;  Surgeon: Ray Adkins MD;  [facility];  Service: Gynecology    TX TOTAL ABDOMINAL HYSTERECT W/WO RMVL TUBE OVARY N/A 2018    Procedure: TOTAL ABDOMINAL HYSTERECTOMY;  Surgeon: Ray Adkins MD;  [facility];  Service: Gynecology    SALPINGECTOMY Bilateral 2018    Procedure: SALPINGECTOMY;  Surgeon:  Ray Adkins MD;  [facility];  Service: Gynecology    WISDOM TOOTH EXTRACTION         Family History   Problem Relation Age of Onset    No Known Problems Mother     Lung cancer Father     No Known Problems Sister     No Known Problems Daughter     No Known Problems Daughter     No Known Problems Daughter     No Known Problems Daughter     No Known Problems Maternal Grandmother     No Known Problems Maternal Grandfather     No Known Problems Paternal Grandmother     No Known Problems Paternal Grandfather     No Known Problems Maternal Aunt      I have reviewed and agree with the history as documented.    E-Cigarette/Vaping    E-Cigarette Use Never User      E-Cigarette/Vaping Substances     Social History     Tobacco Use    Smoking status: Former     Current packs/day: 0.00     Types: Cigarettes     Quit date:      Years since quittin.1    Smokeless tobacco: Never   Vaping Use    Vaping status: Never Used   Substance Use Topics    Alcohol use: Yes     Alcohol/week: 2.0 standard drinks of alcohol     Types: 2 Glasses of wine per week     Comment: social    Drug use: No       Review of Systems   Constitutional:  Positive for chills. Negative for fever.   HENT:  Positive for congestion, rhinorrhea and sore throat.    Respiratory:  Positive for cough. Negative for shortness of breath.    Cardiovascular:  Negative for chest pain and palpitations.   Gastrointestinal:  Negative for abdominal pain, diarrhea, nausea and vomiting.   Genitourinary:  Negative for dysuria and hematuria.   Musculoskeletal:  Negative for back pain and neck pain.   Neurological:  Negative for dizziness, weakness, light-headedness, numbness and headaches.   All other systems reviewed and are negative.      Physical Exam  Physical Exam  Vitals and nursing note reviewed.   Constitutional:       General: She is not in acute distress.     Appearance: Normal appearance. She is normal weight. She is not ill-appearing.   HENT:       Head: Normocephalic and atraumatic.      Right Ear: Tympanic membrane, ear canal and external ear normal.      Left Ear: Tympanic membrane, ear canal and external ear normal.      Nose: Congestion present.      Mouth/Throat:      Mouth: Mucous membranes are moist.      Pharynx: Oropharynx is clear. No oropharyngeal exudate or posterior oropharyngeal erythema.      Tonsils: No tonsillar exudate or tonsillar abscesses.   Eyes:      Extraocular Movements: Extraocular movements intact.      Conjunctiva/sclera: Conjunctivae normal.      Pupils: Pupils are equal, round, and reactive to light.   Cardiovascular:      Rate and Rhythm: Normal rate and regular rhythm.      Pulses: Normal pulses.      Heart sounds: Normal heart sounds. No murmur heard.  Pulmonary:      Effort: Pulmonary effort is normal. No respiratory distress.      Breath sounds: Normal breath sounds. No wheezing or rales.   Abdominal:      General: Abdomen is flat. Bowel sounds are normal. There is no distension.      Palpations: Abdomen is soft.      Tenderness: There is no abdominal tenderness. There is no right CVA tenderness, left CVA tenderness or guarding.   Musculoskeletal:         General: No swelling or tenderness. Normal range of motion.      Cervical back: Normal range of motion and neck supple. No tenderness.   Skin:     General: Skin is warm and dry.      Capillary Refill: Capillary refill takes less than 2 seconds.   Neurological:      General: No focal deficit present.      Mental Status: She is alert and oriented to person, place, and time.         Vital Signs  ED Triage Vitals   Temperature Pulse Respirations Blood Pressure SpO2   02/27/24 2314 02/27/24 2314 02/27/24 2314 02/27/24 2314 02/27/24 2314   98.5 °F (36.9 °C) 75 20 154/97 97 %      Temp Source Heart Rate Source Patient Position - Orthostatic VS BP Location FiO2 (%)   02/27/24 2314 02/27/24 2314 02/27/24 2314 02/27/24 2314 --   Oral Monitor Sitting Right arm       Pain Score        02/28/24 0013       7           Vitals:    02/27/24 2314   BP: 154/97   Pulse: 75   Patient Position - Orthostatic VS: Sitting         Visual Acuity      ED Medications  Medications   dexamethasone oral liquid 10 mg 1 mL (10 mg Oral Given 2/28/24 0013)   acetaminophen (TYLENOL) tablet 975 mg (975 mg Oral Given 2/28/24 0013)   ibuprofen (MOTRIN) tablet 600 mg (600 mg Oral Given 2/28/24 0013)       Diagnostic Studies  Results Reviewed       Procedure Component Value Units Date/Time    COVID/FLU/RSV [[id number]]  (Normal) Collected: 02/27/24 2339    Lab Status: Final result Specimen: Nares from Nose Updated: 02/28/24 0019     SARS-CoV-2 Negative     INFLUENZA A PCR Negative     INFLUENZA B PCR Negative     RSV PCR Negative    Narrative:      FOR PEDIATRIC PATIENTS - copy/paste COVID Guidelines URL to browser: [email or web address]    SARS-CoV-2 assay is a Nucleic Acid Amplification assay intended for the  qualitative detection of nucleic acid from SARS-CoV-2 in nasopharyngeal  swabs. Results are for the presumptive identification of SARS-CoV-2 RNA.    Positive results are indicative of infection with SARS-CoV-2, the virus  causing COVID-19, but do not rule out bacterial infection or co-infection  with other viruses. Laboratories within the [place] and its  territories are required to report all positive results to the appropriate  public health authorities. Negative results do not preclude SARS-CoV-2  infection and should not be used as the sole basis for treatment or other  patient management decisions. Negative results must be combined with  clinical observations, patient history, and epidemiological information.  This test has not been FDA cleared or approved.    This test has been authorized by FDA under an Emergency Use Authorization  (EUA). This test is only authorized for the duration of time the  declaration that circumstances exist justifying the  authorization of the  emergency use of an in vitro diagnostic tests for detection of SARS-CoV-2  virus and/or diagnosis of COVID-19 infection under section 564(b)(1) of  the Act, 21 U.S.C. 360bbb-3(b)(1), unless the authorization is terminated  or revoked sooner. The test has been validated but independent review by FDA  and CLIA is pending.    Test performed using [employer] GeneXpert: This RT-PCR assay targets N2,  a region unique to SARS-CoV-2. A conserved region in the E-gene was chosen  for pan-Sarbecovirus detection which includes SARS-CoV-2.    According to CMS-2020-01-R, this platform meets the definition of high-throughput technology.    Strep A PCR [[id number]]  (Normal) Collected: 02/27/24 2339    Lab Status: Final result Specimen: Throat Updated: 02/28/24 0008     STREP A PCR Not Detected                   No orders to display              Procedures  Procedures         ED Course  ED Course as of 02/28/24 0108   Wed Feb 28, 2024   0017 STREP A PCR: Not Detected   0043 COVID/FLU/RSV  All negative                               SBIRT 22yo+      Flowsheet Row Most Recent Value   Initial Alcohol Screen: US AUDIT-C     1. How often do you have a drink containing alcohol? 0 Filed at: 02/27/2024 2321   2. How many drinks containing alcohol do you have on a typical day you are drinking?  0 Filed at: 02/27/2024 2321   3b. FEMALE Any Age, or MALE 65+: How often do you have 4 or more drinks on one occassion? 0 Filed at: 02/27/2024 2321   Audit-C Score 0 Filed at: 02/27/2024 2321   ANTHONY: How many times in the past year have you...    Used an illegal drug or used a prescription medication for non-medical reasons? Never Filed at: 02/27/2024 2321                      Medical Decision Making  53 y/o female presents to the ED for evaluation of sore throat, nonproductive cough, nasal congestion, and chills.  She states that she started experiencing nasal congestion and a sore throat a week ago and her symptoms have persisted,  subsequently developing a nonproductive cough and chills over the last 1 to 2 days.  She reports a positive sick contact via a child family member who had a recent cough but tested negative for COVID/influenza/RSV.  She denies any documented fevers, chest pain, dyspnea, wheezing, abdominal pain, nausea, vomiting, diarrhea, or urinary symptoms.  She did not take any medications today for her symptoms.    Vital signs reviewed.  See physical exam documentation for exam findings.  Differential diagnosis includes but is not limited to strep pharyngitis, viral pharyngitis, COVID-19, influenza, RSV, and/or other viral URI.  Strep PCR and viral URI testing ordered.  Symptomatic treatment with Tylenol, Motrin, and single dose of oral Decadron.  See ED course for intermittent rotation results.  Workup overall negative for COVID, influenza, RSV, or strep pharyngitis. I discussed all findings, treatment, red flags/return precautions, and outpatient follow-up and the patient/family understand and agree. Stable for discharge.    Amount and/or Complexity of Data Reviewed  Labs:  Decision-making details documented in ED Course.    Risk  OTC drugs.  Prescription drug management.             Disposition  Final diagnoses:   Viral pharyngitis   Viral URI with cough     Time reflects when diagnosis was documented in both MDM as applicable and the Disposition within this note       Time User Action Codes Description Comment    2/28/2024 12:51 AM Geraldo Patel [J02.9] Viral pharyngitis     2/28/2024 12:51 AM Geraldo Patel [J06.9] Viral URI with cough           ED Disposition       ED Disposition   Discharge    Condition   Stable    Date/Time   Wed Feb 28, 2024 0051    Comment   Armida Morrow discharge to home/self care.                   Follow-up Information       Follow up With Specialties Details Why Contact Info Additional Information    Kasia Mora MD Family Medicine Call in 1 week For follow-up [facility]  [facility] [address]  [phone]       [facility] Emergency Medicine Go to  If symptoms worsen [address] [address] [phone] [facility], [address] [address]            Discharge Medication List as of 2/28/2024 12:51 AM        CONTINUE these medications which have NOT CHANGED    Details   metFORMIN (GLUCOPHAGE) 500 mg tablet Take 1 tablet (500 mg total) by mouth 2 (two) times a day with meals, Starting Fri 1/19/2024, Normal             No discharge procedures on file.    PDMP Review       None            ED Provider  Electronically Signed by             Geraldo Patel MD  02/28/24 5988

## 2024-03-19 ENCOUNTER — TELEPHONE (OUTPATIENT)
Age: 54
End: 2024-03-19

## 2024-03-19 NOTE — TELEPHONE ENCOUNTER
Spoke with patient and she was scheduled for Friday with Dr Mora. The only time the patient was available.

## 2024-03-19 NOTE — TELEPHONE ENCOUNTER
The patient called she has a dry cough  it started on 2/20/24    she went to [facility] er  they told her it was viral   and did not give her anything   ---the dry cough  is keeping her up at night  when she gets it she can't stop   she has tried  dayquil, nightquil  robitussin with honey and motrin   nothing is helping any suggestions  please call the patient thank you

## 2024-03-22 ENCOUNTER — OFFICE VISIT (OUTPATIENT)
Dept: FAMILY MEDICINE CLINIC | Facility: CLINIC | Age: 54
End: 2024-03-22
Payer: COMMERCIAL

## 2024-03-22 VITALS
WEIGHT: 197 LBS | HEIGHT: 66 IN | DIASTOLIC BLOOD PRESSURE: 80 MMHG | SYSTOLIC BLOOD PRESSURE: 132 MMHG | BODY MASS INDEX: 31.66 KG/M2 | HEART RATE: 71 BPM | TEMPERATURE: 98 F | OXYGEN SATURATION: 98 %

## 2024-03-22 DIAGNOSIS — J01.10 ACUTE NON-RECURRENT FRONTAL SINUSITIS: Primary | ICD-10-CM

## 2024-03-22 DIAGNOSIS — E11.9 CONTROLLED TYPE 2 DIABETES MELLITUS WITHOUT COMPLICATION, WITHOUT LONG-TERM CURRENT USE OF INSULIN (HCC): ICD-10-CM

## 2024-03-22 PROCEDURE — 99214 OFFICE O/P EST MOD 30 MIN: CPT | Performed by: FAMILY MEDICINE

## 2024-03-22 RX ORDER — AZITHROMYCIN 250 MG/1
TABLET, FILM COATED ORAL
Qty: 6 TABLET | Refills: 0 | Status: SHIPPED | OUTPATIENT
Start: 2024-03-22 | End: 2024-03-26

## 2024-03-22 NOTE — PROGRESS NOTES
Subjective:      Patient ID: Armida Morrow is a 54 y.o. female.    Cough  This is a new problem. The current episode started in the past 7 days. The problem has been unchanged. The cough is Productive of purulent sputum. Associated symptoms include nasal congestion, postnasal drip and a sore throat. Pertinent negatives include no wheezing. The symptoms are aggravated by lying down. She has tried OTC cough suppressant for the symptoms. The treatment provided no relief.       Past Medical History:   Diagnosis Date    History of transfusion 2017    given due to Hgb at 6.6, no rx     (spontaneous vaginal delivery)     x4       Family History   Problem Relation Age of Onset    No Known Problems Mother     Lung cancer Father     No Known Problems Sister     No Known Problems Daughter     No Known Problems Daughter     No Known Problems Daughter     No Known Problems Daughter     No Known Problems Maternal Grandmother     No Known Problems Maternal Grandfather     No Known Problems Paternal Grandmother     No Known Problems Paternal Grandfather     No Known Problems Maternal Aunt        Past Surgical History:   Procedure Laterality Date    HYSTERECTOMY      [place] CYSTOURETHROSCOPY N/A 2018    Procedure: CYSTOSCOPY;  Surgeon: Ray Adkins MD;  [facility];  Service: Gynecology    NC LAPS TOTAL HYSTERECT 250 GM/< W/RMVL TUBE/OVARY N/A 2018    Procedure: TOTAL LAPAROSCOPIC HYSTERECTOMY; BILATERAL SALPINGECTOMY;  Surgeon: Ray Adkins MD;  [facility];  Service: Gynecology    NC TOTAL ABDOMINAL HYSTERECT W/WO RMVL TUBE OVARY N/A 2018    Procedure: TOTAL ABDOMINAL HYSTERECTOMY;  Surgeon: Ray Adkins MD;  [facility];  Service: Gynecology    SALPINGECTOMY Bilateral 2018    Procedure: SALPINGECTOMY;  Surgeon: Ray Adkins MD;  [facility];  Service: Gynecology    WISDOM TOOTH EXTRACTION          reports that she quit smoking about  "16 years ago. She has never used smokeless tobacco. She reports current alcohol use of about 2.0 standard drinks of alcohol per week. She reports that she does not use drugs.      Current Outpatient Medications:     metFORMIN (GLUCOPHAGE) 500 mg tablet, Take 1 tablet (500 mg total) by mouth 2 (two) times a day with meals, Disp: 180 tablet, Rfl: 1    The following portions of the patient's history were reviewed and updated as appropriate: allergies, current medications, past family history, past medical history, past social history, past surgical history and problem list.    Review of Systems   HENT:  Positive for postnasal drip and sore throat.    Respiratory:  Positive for cough. Negative for wheezing.            Objective:    /80   Pulse 71   Temp 98 °F (36.7 °C)   Ht 5' 6\" (1.676 m)   Wt 89.4 kg (197 lb)   LMP 11/08/2017   SpO2 98%   BMI 31.80 kg/m²      Physical Exam  HENT:      Right Ear: Tympanic membrane normal.      Left Ear: Tympanic membrane normal.      Mouth/Throat:      Pharynx: Posterior oropharyngeal erythema present.   Cardiovascular:      Heart sounds: Normal heart sounds.   Pulmonary:      Breath sounds: Normal breath sounds.           Recent Results (from the past 1008 hour(s))   COVID/FLU/RSV    Collection Time: 02/27/24 11:39 PM    Specimen: Nose; Nares   Result Value Ref Range    SARS-CoV-2 Negative Negative    INFLUENZA A PCR Negative Negative    INFLUENZA B PCR Negative Negative    RSV PCR Negative Negative   Strep A PCR    Collection Time: 02/27/24 11:39 PM    Specimen: Throat   Result Value Ref Range    STREP A PCR Not Detected Not Detected       Assessment/Plan:    No problem-specific Assessment & Plan notes found for this encounter.           Problem List Items Addressed This Visit          Respiratory    Acute non-recurrent frontal sinusitis - Primary     Patient started on oral antibiotic.  Can continue with over-the-counter conservative treatment.  Follow-up if symptoms do " not improve.         Relevant Medications    azithromycin (ZITHROMAX) 250 mg tablet       Endocrine    Controlled type 2 diabetes mellitus without complication, without long-term current use of insulin ([facility])       Lab Results   Component Value Date    HGBA1C 7.2 (H) 01/15/2024     Continue with metformin 500 mg twice daily.  Patient has metabolic labs due April.

## 2024-03-22 NOTE — ASSESSMENT & PLAN NOTE
Patient started on oral antibiotic.  Can continue with over-the-counter conservative treatment.  Follow-up if symptoms do not improve.

## 2024-03-22 NOTE — ASSESSMENT & PLAN NOTE
Lab Results   Component Value Date    HGBA1C 7.2 (H) 01/15/2024     Continue with metformin 500 mg twice daily.  Patient has metabolic labs due April.

## 2024-04-25 ENCOUNTER — APPOINTMENT (OUTPATIENT)
Dept: LAB | Facility: CLINIC | Age: 54
End: 2024-04-25
Payer: COMMERCIAL

## 2024-04-25 DIAGNOSIS — E78.2 MIXED HYPERLIPIDEMIA: ICD-10-CM

## 2024-04-25 DIAGNOSIS — E11.9 CONTROLLED TYPE 2 DIABETES MELLITUS WITHOUT COMPLICATION, WITHOUT LONG-TERM CURRENT USE OF INSULIN (HCC): ICD-10-CM

## 2024-04-25 LAB
ALBUMIN SERPL BCP-MCNC: 4.2 G/DL (ref 3.5–5)
ALP SERPL-CCNC: 86 U/L (ref 34–104)
ALT SERPL W P-5'-P-CCNC: 32 U/L (ref 7–52)
ANION GAP SERPL CALCULATED.3IONS-SCNC: 8 MMOL/L (ref 4–13)
AST SERPL W P-5'-P-CCNC: 20 U/L (ref 13–39)
BILIRUB SERPL-MCNC: 0.59 MG/DL (ref 0.2–1)
BUN SERPL-MCNC: 12 MG/DL (ref 5–25)
CALCIUM SERPL-MCNC: 9.6 MG/DL (ref 8.4–10.2)
CHLORIDE SERPL-SCNC: 103 MMOL/L (ref 96–108)
CHOLEST SERPL-MCNC: 185 MG/DL
CO2 SERPL-SCNC: 26 MMOL/L (ref 21–32)
CREAT SERPL-MCNC: 0.69 MG/DL (ref 0.6–1.3)
CREAT UR-MCNC: 77 MG/DL
EST. AVERAGE GLUCOSE BLD GHB EST-MCNC: 163 MG/DL
GFR SERPL CREATININE-BSD FRML MDRD: 99 ML/MIN/1.73SQ M
GLUCOSE P FAST SERPL-MCNC: 155 MG/DL (ref 65–99)
HBA1C MFR BLD: 7.3 %
HDLC SERPL-MCNC: 59 MG/DL
LDLC SERPL CALC-MCNC: 106 MG/DL (ref 0–100)
MICROALBUMIN UR-MCNC: <7 MG/L
MICROALBUMIN/CREAT 24H UR: <9 MG/G CREATININE (ref 0–30)
POTASSIUM SERPL-SCNC: 4.1 MMOL/L (ref 3.5–5.3)
PROT SERPL-MCNC: 7.2 G/DL (ref 6.4–8.4)
SODIUM SERPL-SCNC: 137 MMOL/L (ref 135–147)
TRIGL SERPL-MCNC: 102 MG/DL

## 2024-04-25 PROCEDURE — 83036 HEMOGLOBIN GLYCOSYLATED A1C: CPT

## 2024-04-25 PROCEDURE — 82043 UR ALBUMIN QUANTITATIVE: CPT

## 2024-04-25 PROCEDURE — 3051F HG A1C>EQUAL 7.0%<8.0%: CPT | Performed by: FAMILY MEDICINE

## 2024-04-25 PROCEDURE — 80061 LIPID PANEL: CPT

## 2024-04-25 PROCEDURE — 82570 ASSAY OF URINE CREATININE: CPT

## 2024-04-25 PROCEDURE — 36415 COLL VENOUS BLD VENIPUNCTURE: CPT

## 2024-04-25 PROCEDURE — 80053 COMPREHEN METABOLIC PANEL: CPT

## 2024-04-26 ENCOUNTER — OFFICE VISIT (OUTPATIENT)
Dept: FAMILY MEDICINE CLINIC | Facility: CLINIC | Age: 54
End: 2024-04-26
Payer: COMMERCIAL

## 2024-04-26 VITALS
BODY MASS INDEX: 31.66 KG/M2 | SYSTOLIC BLOOD PRESSURE: 116 MMHG | WEIGHT: 197 LBS | HEIGHT: 66 IN | OXYGEN SATURATION: 97 % | HEART RATE: 82 BPM | DIASTOLIC BLOOD PRESSURE: 78 MMHG

## 2024-04-26 DIAGNOSIS — M25.552 PAIN OF LEFT HIP: ICD-10-CM

## 2024-04-26 DIAGNOSIS — E11.9 CONTROLLED TYPE 2 DIABETES MELLITUS WITHOUT COMPLICATION, WITHOUT LONG-TERM CURRENT USE OF INSULIN (HCC): Primary | ICD-10-CM

## 2024-04-26 DIAGNOSIS — Z00.00 PREVENTATIVE HEALTH CARE: ICD-10-CM

## 2024-04-26 DIAGNOSIS — E78.2 MIXED HYPERLIPIDEMIA: ICD-10-CM

## 2024-04-26 PROBLEM — J01.10 ACUTE NON-RECURRENT FRONTAL SINUSITIS: Status: RESOLVED | Noted: 2024-03-22 | Resolved: 2024-04-26

## 2024-04-26 PROCEDURE — 99396 PREV VISIT EST AGE 40-64: CPT | Performed by: FAMILY MEDICINE

## 2024-04-26 PROCEDURE — 99214 OFFICE O/P EST MOD 30 MIN: CPT | Performed by: FAMILY MEDICINE

## 2024-04-26 NOTE — ASSESSMENT & PLAN NOTE
LDL continues to be borderline high, 106.   Recommended low-fat diet/monitoring.  Offered statin but patient declines today.

## 2024-04-26 NOTE — ASSESSMENT & PLAN NOTE
Patient has undergone x-ray of the hip which was reported normal.  Patient has done physical therapy which helps.  Currently she is doing Home exercise which helps.  Patient can continue over-the-counter Tylenol if need be.

## 2024-04-26 NOTE — ASSESSMENT & PLAN NOTE
Patient is up-to-date on breast, cervical and colorectal cancer screening.  Received Adacel in 2023.  Declines shingles vaccine today.

## 2024-04-26 NOTE — PROGRESS NOTES
Subjective:      Patient ID: Armida Morrow is a 54 y.o. female.    HPI  She presents for her follow-up diabetic visit. She has type 2 diabetes mellitus. Her disease course has been stable. There are no diabetic associated symptoms. Risk factors for coronary artery disease include dyslipidemia, diabetes mellitus and post-menopausal. Current diabetic treatment includes oral agent (monotherapy). She is compliant with treatment all of the time.   Labs reviewed with patient today.   , Currently not on statin.  Patient has a longstanding history of left hip pain.  Has undergone previous x-ray therapy.  X-ray apparently was reported normal.  Reports not available.    Past Medical History:   Diagnosis Date    History of transfusion 2017    given due to Hgb at 6.6, no rx     (spontaneous vaginal delivery)     x4       Family History   Problem Relation Age of Onset    No Known Problems Mother     Lung cancer Father     No Known Problems Sister     No Known Problems Daughter     No Known Problems Daughter     No Known Problems Daughter     No Known Problems Daughter     No Known Problems Maternal Grandmother     No Known Problems Maternal Grandfather     No Known Problems Paternal Grandmother     No Known Problems Paternal Grandfather     No Known Problems Maternal Aunt        Past Surgical History:   Procedure Laterality Date    HYSTERECTOMY      [place] CYSTOURETHROSCOPY N/A 2018    Procedure: CYSTOSCOPY;  Surgeon: Ray Adkins MD;  [facility];  Service: Gynecology    AK LAPS TOTAL HYSTERECT 250 GM/< W/RMVL TUBE/OVARY N/A 2018    Procedure: TOTAL LAPAROSCOPIC HYSTERECTOMY; BILATERAL SALPINGECTOMY;  Surgeon: Ray Adkins MD;  [facility];  Service: Gynecology    [place] TOTAL ABDOMINAL HYSTERECT W/WO RMVL TUBE OVARY N/A 2018    Procedure: TOTAL ABDOMINAL HYSTERECTOMY;  Surgeon: Ray Adkins MD;  [facility];  Service: Gynecology    SALPINGECTOMY  "Bilateral 01/08/2018    Procedure: SALPINGECTOMY;  Surgeon: Ray Adkins MD;  [facility];  Service: Gynecology    WISDOM TOOTH EXTRACTION          reports that she quit smoking about 16 years ago. She has never used smokeless tobacco. She reports current alcohol use of about 2.0 standard drinks of alcohol per week. She reports that she does not use drugs.      Current Outpatient Medications:     metFORMIN (GLUCOPHAGE) 500 mg tablet, Take 1 tablet (500 mg total) by mouth 2 (two) times a day with meals, Disp: 180 tablet, Rfl: 1    The following portions of the patient's history were reviewed and updated as appropriate: allergies, current medications, past family history, past medical history, past social history, past surgical history and problem list.    Review of Systems   Constitutional: Negative.    Respiratory: Negative.     Cardiovascular: Negative.            Objective:    /78   Pulse 82   Ht 5' 6\" (1.676 m)   Wt 89.4 kg (197 lb)   LMP 11/08/2017   SpO2 97%   BMI 31.80 kg/m²      Physical Exam  Constitutional:       Appearance: Normal appearance.   Cardiovascular:      Pulses: no weak pulses.      Heart sounds: Normal heart sounds.   Pulmonary:      Breath sounds: Normal breath sounds.   Abdominal:      Palpations: Abdomen is soft.   Feet:      Right foot:      Skin integrity: No ulcer, skin breakdown, erythema, warmth, callus or dry skin.      Left foot:      Skin integrity: No ulcer, skin breakdown, erythema, warmth, callus or dry skin.         Patient's shoes and socks removed.    Right Foot/Ankle   Right Foot Inspection  Skin Exam: skin normal and skin intact. No dry skin, no warmth, no callus, no erythema, no maceration, no abnormal color, no pre-ulcer, no ulcer and no callus.     Toe Exam: ROM and strength within normal limits.     Sensory   Vibration: intact  Proprioception: intact  Monofilament testing: intact    Vascular  Capillary refills: < 3 seconds      Left " Foot/Ankle  Left Foot Inspection  Skin Exam: skin normal and skin intact. No dry skin, no warmth, no erythema, no maceration, normal color, no pre-ulcer, no ulcer and no callus.     Toe Exam: ROM and strength within normal limits.     Sensory   Vibration: intact  Proprioception: intact  Monofilament testing: intact    Vascular  Capillary refills: < 3 seconds      Assign Risk Category  No deformity present  No loss of protective sensation  No weak pulses  Risk: 0     Recent Results (from the past 1008 hour(s))   Comprehensive metabolic panel    Collection Time: 04/25/24  8:34 AM   Result Value Ref Range    Sodium 137 135 - 147 mmol/L    Potassium 4.1 3.5 - 5.3 mmol/L    Chloride 103 96 - 108 mmol/L    CO2 26 21 - 32 mmol/L    ANION GAP 8 4 - 13 mmol/L    BUN 12 5 - 25 mg/dL    Creatinine 0.69 0.60 - 1.30 mg/dL    Glucose, Fasting 155 (H) 65 - 99 mg/dL    Calcium 9.6 8.4 - 10.2 mg/dL    AST 20 13 - 39 U/L    ALT 32 7 - 52 U/L    Alkaline Phosphatase 86 34 - 104 U/L    Total Protein 7.2 6.4 - 8.4 g/dL    Albumin 4.2 3.5 - 5.0 g/dL    Total Bilirubin 0.59 0.20 - 1.00 mg/dL    eGFR 99 ml/min/1.73sq m   Lipid Panel with Direct LDL reflex    Collection Time: 04/25/24  8:34 AM   Result Value Ref Range    Cholesterol 185 See Comment mg/dL    Triglycerides 102 See Comment mg/dL    HDL, Direct 59 >=50 mg/dL    LDL Calculated 106 (H) 0 - 100 mg/dL   Hemoglobin A1C    Collection Time: 04/25/24  8:34 AM   Result Value Ref Range    Hemoglobin A1C 7.3 (H) Normal 4.0-5.6%; PreDiabetic 5.7-6.4%; Diabetic >=6.5%; Glycemic control for adults with diabetes <7.0% %     mg/dl   Albumin / creatinine urine ratio    Collection Time: 04/25/24  8:34 AM   Result Value Ref Range    Creatinine, Ur 77.0 Reference range not established. mg/dL    Albumin,U,Random <7.0 <20.0 mg/L    Albumin Creat Ratio <9 0 - 30 mg/g creatinine       Assessment/Plan:    No problem-specific Assessment & Plan notes found for this encounter.    1. Controlled type  2 diabetes mellitus without complication, without long-term current use of insulin ([facility])  Assessment & Plan:    Lab Results   Component Value Date    HGBA1C 7.3 (H) 04/25/2024   FBS remains elevated, 155. Advised to increase metformin.   Patient declines  Continue with metformin 500 mg twice daily.    Advised nutrition consult.   Declines home glucose testing.     Orders:  -     Hemoglobin A1C; Future; Expected date: 08/24/2024  -     Comprehensive metabolic panel; Future; Expected date: 08/24/2024  -     Ambulatory Referral to Nutrition Services; Future  -     metFORMIN (GLUCOPHAGE) 500 mg tablet; Take 1 tablet (500 mg total) by mouth 2 (two) times a day with meals    2. Mixed hyperlipidemia  Assessment & Plan:  LDL continues to be borderline high, 106.   Recommended low-fat diet/monitoring.  Offered statin but patient declines today.    Orders:  -     Lipid Panel with Direct LDL reflex; Future; Expected date: 08/24/2024  -     Ambulatory Referral to Nutrition Services; Future    3. Pain of left hip  Assessment & Plan:  Patient has undergone x-ray of the hip which was reported normal.  Patient has done physical therapy which helps.  Currently she is doing Home exercise which helps.  Patient can continue over-the-counter Tylenol if need be.       4. Preventative health care  Assessment & Plan:  Patient is up-to-date on breast, cervical and colorectal cancer screening.  Received Adacel in 2023.  Declines shingles vaccine today.

## 2024-04-26 NOTE — ASSESSMENT & PLAN NOTE
Lab Results   Component Value Date    HGBA1C 7.3 (H) 04/25/2024   FBS remains elevated, 155. Advised to increase metformin.   Patient declines  Continue with metformin 500 mg twice daily.    Advised nutrition consult.   Declines home glucose testing.

## 2024-09-04 ENCOUNTER — RA CDI HCC (OUTPATIENT)
Dept: OTHER | Facility: HOSPITAL | Age: 54
End: 2024-09-04

## 2024-12-07 ENCOUNTER — HOSPITAL ENCOUNTER (EMERGENCY)
Facility: HOSPITAL | Age: 54
Discharge: HOME | End: 2024-12-07
Attending: EMERGENCY MEDICINE
Payer: COMMERCIAL

## 2024-12-07 ENCOUNTER — APPOINTMENT (EMERGENCY)
Dept: RADIOLOGY | Facility: HOSPITAL | Age: 54
End: 2024-12-07
Payer: COMMERCIAL

## 2024-12-07 VITALS
HEART RATE: 88 BPM | RESPIRATION RATE: 18 BRPM | TEMPERATURE: 99.4 F | WEIGHT: 193 LBS | BODY MASS INDEX: 31.15 KG/M2 | SYSTOLIC BLOOD PRESSURE: 120 MMHG | DIASTOLIC BLOOD PRESSURE: 68 MMHG | OXYGEN SATURATION: 97 %

## 2024-12-07 DIAGNOSIS — J18.9 PNEUMONIA: Primary | ICD-10-CM

## 2024-12-07 LAB
ALBUMIN SERPL BCG-MCNC: 4.2 G/DL (ref 3.5–5)
ALP SERPL-CCNC: 88 U/L (ref 34–104)
ALT SERPL W P-5'-P-CCNC: 23 U/L (ref 7–52)
ANION GAP SERPL CALCULATED.3IONS-SCNC: 10 MMOL/L (ref 4–13)
AST SERPL W P-5'-P-CCNC: 19 U/L (ref 13–39)
BACTERIA UR QL AUTO: NORMAL /HPF
BASOPHILS # BLD AUTO: 0.04 THOUSANDS/ÂΜL (ref 0–0.1)
BASOPHILS NFR BLD AUTO: 1 % (ref 0–1)
BILIRUB SERPL-MCNC: 0.7 MG/DL (ref 0.2–1)
BILIRUB UR QL STRIP: NEGATIVE
BUN SERPL-MCNC: 9 MG/DL (ref 5–25)
CALCIUM SERPL-MCNC: 9.6 MG/DL (ref 8.4–10.2)
CHLORIDE SERPL-SCNC: 103 MMOL/L (ref 96–108)
CLARITY UR: CLEAR
CO2 SERPL-SCNC: 20 MMOL/L (ref 21–32)
COLOR UR: YELLOW
CREAT SERPL-MCNC: 0.88 MG/DL (ref 0.6–1.3)
EOSINOPHIL # BLD AUTO: 0.06 THOUSAND/ÂΜL (ref 0–0.61)
EOSINOPHIL NFR BLD AUTO: 1 % (ref 0–6)
ERYTHROCYTE [DISTWIDTH] IN BLOOD BY AUTOMATED COUNT: 12.3 % (ref 11.6–15.1)
FLUAV AG UPPER RESP QL IA.RAPID: NEGATIVE
FLUBV AG UPPER RESP QL IA.RAPID: NEGATIVE
GFR SERPL CREATININE-BSD FRML MDRD: 74 ML/MIN/1.73SQ M
GLUCOSE SERPL-MCNC: 137 MG/DL (ref 65–140)
GLUCOSE UR STRIP-MCNC: ABNORMAL MG/DL
HCT VFR BLD AUTO: 40.1 % (ref 34.8–46.1)
HGB BLD-MCNC: 13 G/DL (ref 11.5–15.4)
HGB UR QL STRIP.AUTO: ABNORMAL
IMM GRANULOCYTES # BLD AUTO: 0.01 THOUSAND/UL (ref 0–0.2)
IMM GRANULOCYTES NFR BLD AUTO: 0 % (ref 0–2)
KETONES UR STRIP-MCNC: NEGATIVE MG/DL
LACTATE SERPL-SCNC: 1.7 MMOL/L (ref 0.5–2)
LEUKOCYTE ESTERASE UR QL STRIP: NEGATIVE
LIPASE SERPL-CCNC: 46 U/L (ref 11–82)
LYMPHOCYTES # BLD AUTO: 1.1 THOUSANDS/ÂΜL (ref 0.6–4.47)
LYMPHOCYTES NFR BLD AUTO: 16 % (ref 14–44)
MCH RBC QN AUTO: 28.8 PG (ref 26.8–34.3)
MCHC RBC AUTO-ENTMCNC: 32.4 G/DL (ref 31.4–37.4)
MCV RBC AUTO: 89 FL (ref 82–98)
MONOCYTES # BLD AUTO: 0.94 THOUSAND/ÂΜL (ref 0.17–1.22)
MONOCYTES NFR BLD AUTO: 14 % (ref 4–12)
NEUTROPHILS # BLD AUTO: 4.83 THOUSANDS/ÂΜL (ref 1.85–7.62)
NEUTS SEG NFR BLD AUTO: 68 % (ref 43–75)
NITRITE UR QL STRIP: NEGATIVE
NON-SQ EPI CELLS URNS QL MICRO: NORMAL /HPF
NRBC BLD AUTO-RTO: 0 /100 WBCS
PH UR STRIP.AUTO: 5.5 [PH]
PLATELET # BLD AUTO: 237 THOUSANDS/UL (ref 149–390)
PMV BLD AUTO: 9.5 FL (ref 8.9–12.7)
POTASSIUM SERPL-SCNC: 3.9 MMOL/L (ref 3.5–5.3)
PROT SERPL-MCNC: 7.9 G/DL (ref 6.4–8.4)
PROT UR STRIP-MCNC: ABNORMAL MG/DL
RBC # BLD AUTO: 4.51 MILLION/UL (ref 3.81–5.12)
RBC #/AREA URNS AUTO: NORMAL /HPF
SARS-COV+SARS-COV-2 AG RESP QL IA.RAPID: NEGATIVE
SODIUM SERPL-SCNC: 133 MMOL/L (ref 135–147)
SP GR UR STRIP.AUTO: 1.02 (ref 1–1.03)
UROBILINOGEN UR STRIP-ACNC: <2 MG/DL
WBC # BLD AUTO: 6.98 THOUSAND/UL (ref 4.31–10.16)
WBC #/AREA URNS AUTO: NORMAL /HPF

## 2024-12-07 PROCEDURE — 85025 COMPLETE CBC W/AUTO DIFF WBC: CPT | Performed by: EMERGENCY MEDICINE

## 2024-12-07 PROCEDURE — 83690 ASSAY OF LIPASE: CPT | Performed by: EMERGENCY MEDICINE

## 2024-12-07 PROCEDURE — 81001 URINALYSIS AUTO W/SCOPE: CPT | Performed by: EMERGENCY MEDICINE

## 2024-12-07 PROCEDURE — 87804 INFLUENZA ASSAY W/OPTIC: CPT | Performed by: EMERGENCY MEDICINE

## 2024-12-07 PROCEDURE — 99283 EMERGENCY DEPT VISIT LOW MDM: CPT

## 2024-12-07 PROCEDURE — 87811 SARS-COV-2 COVID19 W/OPTIC: CPT | Performed by: EMERGENCY MEDICINE

## 2024-12-07 PROCEDURE — 96360 HYDRATION IV INFUSION INIT: CPT

## 2024-12-07 PROCEDURE — 71045 X-RAY EXAM CHEST 1 VIEW: CPT

## 2024-12-07 PROCEDURE — 36415 COLL VENOUS BLD VENIPUNCTURE: CPT | Performed by: EMERGENCY MEDICINE

## 2024-12-07 PROCEDURE — 83605 ASSAY OF LACTIC ACID: CPT | Performed by: EMERGENCY MEDICINE

## 2024-12-07 PROCEDURE — 80053 COMPREHEN METABOLIC PANEL: CPT | Performed by: EMERGENCY MEDICINE

## 2024-12-07 PROCEDURE — 99285 EMERGENCY DEPT VISIT HI MDM: CPT | Performed by: EMERGENCY MEDICINE

## 2024-12-07 PROCEDURE — 87040 BLOOD CULTURE FOR BACTERIA: CPT | Performed by: EMERGENCY MEDICINE

## 2024-12-07 RX ORDER — DOXYCYCLINE 100 MG/1
100 CAPSULE ORAL 2 TIMES DAILY
Qty: 14 CAPSULE | Refills: 0 | Status: SHIPPED | OUTPATIENT
Start: 2024-12-07 | End: 2024-12-14

## 2024-12-07 RX ORDER — DOXYCYCLINE 100 MG/1
100 CAPSULE ORAL ONCE
Status: COMPLETED | OUTPATIENT
Start: 2024-12-07 | End: 2024-12-07

## 2024-12-07 RX ORDER — ACETAMINOPHEN 325 MG/1
650 TABLET ORAL ONCE
Status: COMPLETED | OUTPATIENT
Start: 2024-12-07 | End: 2024-12-07

## 2024-12-07 RX ADMIN — ACETAMINOPHEN 650 MG: 325 TABLET ORAL at 15:52

## 2024-12-07 RX ADMIN — SODIUM CHLORIDE 1000 ML: 0.9 INJECTION, SOLUTION INTRAVENOUS at 15:52

## 2024-12-07 RX ADMIN — DOXYCYCLINE 100 MG: 100 CAPSULE ORAL at 17:35

## 2024-12-08 NOTE — ED PROVIDER NOTES
Time reflects when diagnosis was documented in both MDM as applicable and the Disposition within this note       Time User Action Codes Description Comment    12/7/2024  5:09 PM Toi Swan Add [J18.9] Pneumonia           ED Disposition       ED Disposition   Discharge    Condition   Stable    Date/Time   Sat Dec 7, 2024  5:09 PM    Comment   Armida Morrow discharge to home/self care.                   Assessment & Plan       Medical Decision Making  Pulse ox 97% on room air indicating adequate oxygenation.  CXR: Right-sided infiltrate with pneumonia as read by me    Differential diagnose include but not limited to at risk for sepsis, pneumonia, UTI, viral URI    Lab work was reassuring patient's vitals improved in the ER oxygen and respirations are normal at time of discharge.  Will treat with oral antibiotics for pneumonia follow-up with primary care physician to resolution return to ER symptoms worsen.    Amount and/or Complexity of Data Reviewed  Labs: ordered.  Radiology: ordered and independent interpretation performed.    Risk  OTC drugs.  Prescription drug management.             Medications   acetaminophen (TYLENOL) tablet 650 mg (650 mg Oral Given 12/7/24 1552)   sodium chloride 0.9 % bolus 1,000 mL (0 mL Intravenous Stopped 12/7/24 1644)   doxycycline hyclate (VIBRAMYCIN) capsule 100 mg (100 mg Oral Given 12/7/24 1735)       ED Risk Strat Scores                           SBIRT 20yo+      Flowsheet Row Most Recent Value   Initial Alcohol Screen: US AUDIT-C     1. How often do you have a drink containing alcohol? 0 Filed at: 12/07/2024 1547   2. How many drinks containing alcohol do you have on a typical day you are drinking?  0 Filed at: 12/07/2024 1547   3b. FEMALE Any Age, or MALE 65+: How often do you have 4 or more drinks on one occassion? 0 Filed at: 12/07/2024 1547   Audit-C Score 0 Filed at: 12/07/2024 1547   ANTHONY: How many times in the past year have you...    Used an illegal drug or used a  prescription medication for non-medical reasons? Never Filed at: 2024 1547                            History of Present Illness       Chief Complaint   Patient presents with    Fever     Started with scratchy throat three days ago, progressed to body aches headache, blood sugar was over 200       Past Medical History:   Diagnosis Date    History of transfusion 2017    given due to Hgb at 6.6, no rx     (spontaneous vaginal delivery)     x4      Past Surgical History:   Procedure Laterality Date    HYSTERECTOMY      [place] CYSTOURETHROSCOPY N/A 2018    Procedure: CYSTOSCOPY;  Surgeon: Ray Adkins MD;  [facility];  Service: Gynecology    [place] LAPS TOTAL HYSTERECT 250 GM/< W/RMVL TUBE/OVARY N/A 2018    Procedure: TOTAL LAPAROSCOPIC HYSTERECTOMY; BILATERAL SALPINGECTOMY;  Surgeon: Ray Adkins MD;  [facility];  Service: Gynecology    [place] TOTAL ABDOMINAL HYSTERECT W/WO RMVL TUBE OVARY N/A 2018    Procedure: TOTAL ABDOMINAL HYSTERECTOMY;  Surgeon: Ray Adkins MD;  [facility];  Service: Gynecology    SALPINGECTOMY Bilateral 2018    Procedure: SALPINGECTOMY;  Surgeon: Ray Adkins MD;  [facility];  Service: Gynecology    WISDOM TOOTH EXTRACTION        Family History   Problem Relation Age of Onset    No Known Problems Mother     Lung cancer Father     No Known Problems Sister     No Known Problems Daughter     No Known Problems Daughter     No Known Problems Daughter     No Known Problems Daughter     No Known Problems Maternal Grandmother     No Known Problems Maternal Grandfather     No Known Problems Paternal Grandmother     No Known Problems Paternal Grandfather     No Known Problems Maternal Aunt       Social History     Tobacco Use    Smoking status: Former     Current packs/day: 0.00     Types: Cigarettes     Quit date:      Years since quittin.9    Smokeless tobacco: Never   Vaping Use    Vaping  status: Never Used   Substance Use Topics    Alcohol use: Yes     Alcohol/week: 2.0 standard drinks of alcohol     Types: 2 Glasses of wine per week     Comment: social    Drug use: No      E-Cigarette/Vaping    E-Cigarette Use Never User       E-Cigarette/Vaping Substances      I have reviewed and agree with the history as documented.     Patient presents for evaluation of a scratchy throat starting 3 days ago that progressed to generalized bodyaches headache and cough.  States her sugar also been up around 200.  No nausea or vomiting.  Has been around grandchildren who have been sick with respiratory infections.      History provided by:  Patient   used: No    Fever  Associated symptoms: congestion, cough, fatigue, fever and sore throat        Review of Systems   Constitutional:  Positive for fatigue and fever.   HENT:  Positive for congestion and sore throat.    Respiratory:  Positive for cough.    All other systems reviewed and are negative.          Objective       ED Triage Vitals [12/07/24 1502]   Temperature Pulse Blood Pressure Respirations SpO2 Patient Position - Orthostatic VS   (!) 101.8 °F (38.8 °C) (!) 116 131/89 (!) 24 96 % Sitting      Temp Source Heart Rate Source BP Location FiO2 (%) Pain Score    Tympanic Monitor Right arm -- 6      Vitals      Date and Time Temp Pulse SpO2 Resp BP Pain Score FACES Pain Rating User   12/07/24 1738 99.4 °F (37.4 °C) 88 97 % 18 120/68 -- --    12/07/24 1630 -- 95 96 % 20 132/60 -- --    12/07/24 1600 -- 105 97 % 20 126/60 -- --    12/07/24 1552 -- -- -- -- -- Med Not Given for Pain - for MAR use only --    12/07/24 1530 -- 116 98 % 20 142/89 -- --    12/07/24 1502 101.8 °F (38.8 °C) 116 96 % 24 131/89 6 -- LS            Physical Exam  Vitals and nursing note reviewed.   Constitutional:       General: She is not in acute distress.     Appearance: She is ill-appearing.   Cardiovascular:      Rate and Rhythm: Regular rhythm. Tachycardia  present.   Pulmonary:      Effort: Pulmonary effort is normal. No respiratory distress.      Breath sounds: Normal breath sounds.   Abdominal:      Tenderness: There is no abdominal tenderness.   Neurological:      General: No focal deficit present.      Mental Status: She is alert and oriented to person, place, and time.         Results Reviewed       Procedure Component Value Units Date/Time    Blood culture #2 [[id number]] Collected: 12/07/24 1546    Lab Status: Preliminary result Specimen: Blood from Arm, Left Updated: 12/08/24 0109     Blood Culture Received in Microbiology Lab. Culture in Progress.    Blood culture #1 [[id number]] Collected: 12/07/24 1546    Lab Status: Preliminary result Specimen: Blood from Hand, Right Updated: 12/08/24 0102     Blood Culture Received in Microbiology Lab. Culture in Progress.    Lactic acid, plasma (w/reflex if result > 2.0) [[id number]]  (Normal) Collected: 12/07/24 1546    Lab Status: Final result Specimen: Blood from Arm, Left Updated: 12/07/24 1704     LACTIC ACID 1.7 mmol/L     Narrative:      Result may be elevated if tourniquet was used during collection.    Urine Microscopic [[id number]]  (Normal) Collected: 12/07/24 1554    Lab Status: Final result Specimen: Urine, Clean Catch Updated: 12/07/24 1645     RBC, UA 0-5 /hpf      WBC, UA 0-1 /hpf      Epithelial Cells Occasional /hpf      Bacteria, UA Occasional /hpf     Comprehensive metabolic panel [[id number]]  (Abnormal) Collected: 12/07/24 1546    Lab Status: Final result Specimen: Blood from Arm, Left Updated: 12/07/24 1632     Sodium 133 mmol/L      Potassium 3.9 mmol/L      Chloride 103 mmol/L      CO2 20 mmol/L      ANION GAP 10 mmol/L      BUN 9 mg/dL      Creatinine 0.88 mg/dL      Glucose 137 mg/dL      Calcium 9.6 mg/dL      AST 19 U/L      ALT 23 U/L      Alkaline Phosphatase 88 U/L      Total Protein 7.9 g/dL      Albumin 4.2 g/dL      Total Bilirubin 0.70 mg/dL      eGFR 74 ml/min/1.73sq m     Narrative:       National Kidney Disease Foundation guidelines for Chronic Kidney Disease (CKD):     Stage 1 with normal or high GFR (GFR > 90 mL/min/1.73 square meters)    Stage 2 Mild CKD (GFR = 60-89 mL/min/1.73 square meters)    Stage 3A Moderate CKD (GFR = 45-59 mL/min/1.73 square meters)    Stage 3B Moderate CKD (GFR = 30-44 mL/min/1.73 square meters)    Stage 4 Severe CKD (GFR = 15-29 mL/min/1.73 square meters)    Stage 5 End Stage CKD (GFR <15 mL/min/1.73 square meters)  Note: GFR calculation is accurate only with a steady state creatinine    Lipase [[id number]]  (Normal) Collected: 12/07/24 1546    Lab Status: Final result Specimen: Blood from Arm, Left Updated: 12/07/24 1632     Lipase 46 u/L     FLU/COVID Rapid Antigen (30 min. TAT) - Preferred screening test in ED [[id number]]  (Normal) Collected: 12/07/24 1546    Lab Status: Final result Specimen: Nares from Nose Updated: 12/07/24 1613     SARS COV Rapid Antigen Negative     Influenza A Rapid Antigen Negative     Influenza B Rapid Antigen Negative    Narrative:      This test has been performed using the Quidel Lili 2 FLU+SARS Antigen test under the Emergency Use Authorization (EUA). This test has been validated by the  and verified by the performing laboratory. The Lili uses lateral flow immunofluorescent sandwich assay to detect SARS-COV, Influenza A and Influenza B Antigen.     The Quidel Lili 2 SARS Antigen test does not differentiate between SARS-CoV and SARS-CoV-2.     Negative results are presumptive and may be confirmed with a molecular assay, if necessary, for patient management. Negative results do not rule out SARS-CoV-2 or influenza infection and should not be used as the sole basis for treatment or patient management decisions. A negative test result may occur if the level of antigen in a sample is below the limit of detection of this test.     Positive results are indicative of the presence of viral antigens, but do not rule out bacterial  infection or co-infection with other viruses.     All test results should be used as an adjunct to clinical observations and other information available to the provider.    FOR PEDIATRIC PATIENTS - copy/paste COVID Guidelines URL to browser: [email or web address]    UA w Reflex to Microscopic w Reflex to Culture [[id number]]  (Abnormal) Collected: 12/07/24 1554    Lab Status: Final result Specimen: Urine, Clean Catch Updated: 12/07/24 1609     Color, UA Yellow     Clarity, UA Clear     Specific Gravity, UA 1.020     pH, UA 5.5     Leukocytes, UA Negative     Nitrite, UA Negative     Protein, UA 30 (1+) mg/dl      Glucose,  (3/10%) mg/dl      Ketones, UA Negative mg/dl      Urobilinogen, UA <2.0 mg/dl      Bilirubin, UA Negative     Occult Blood, UA Small    CBC and differential [[id number]]  (Abnormal) Collected: 12/07/24 1546    Lab Status: Final result Specimen: Blood from Arm, Left Updated: 12/07/24 1555     WBC 6.98 Thousand/uL      RBC 4.51 Million/uL      Hemoglobin 13.0 g/dL      Hematocrit 40.1 %      MCV 89 fL      MCH 28.8 pg      MCHC 32.4 g/dL      RDW 12.3 %      MPV 9.5 fL      Platelets 237 Thousands/uL      nRBC 0 /100 WBCs      Segmented % 68 %      Immature Grans % 0 %      Lymphocytes % 16 %      Monocytes % 14 %      Eosinophils Relative 1 %      Basophils Relative 1 %      Absolute Neutrophils 4.83 Thousands/µL      Absolute Immature Grans 0.01 Thousand/uL      Absolute Lymphocytes 1.10 Thousands/µL      Absolute Monocytes 0.94 Thousand/µL      Eosinophils Absolute 0.06 Thousand/µL      Basophils Absolute 0.04 Thousands/µL             XR chest 1 view portable   Final Interpretation by Malathi Ríos MD (12/08 0739)      Moderate consolidation right base compatible with pneumonia.      This agrees with the clinical impression in the progress note from the ED clinician.            Workstation performed: [id number]              Procedures    ED Medication and Procedure Management   Prior to Admission Medications   Prescriptions Last Dose Informant Patient Reported? Taking?   metFORMIN (GLUCOPHAGE) 500 mg tablet 12/7/2024  No Yes   Sig: Take 1 tablet (500 mg total) by mouth 2 (two) times a day with meals      Facility-Administered Medications: None     Discharge Medication List as of 12/7/2024  5:10 PM        START taking these medications    Details   doxycycline hyclate (VIBRAMYCIN) 100 mg capsule Take 1 capsule (100 mg total) by mouth 2 (two) times a day for 7 days, Starting Sat 12/7/2024, Until Sat 12/14/2024, Normal           CONTINUE these medications which have NOT CHANGED    Details   metFORMIN (GLUCOPHAGE) 500 mg tablet Take 1 tablet (500 mg total) by mouth 2 (two) times a day with meals, Starting Fri 4/26/2024, Normal           No discharge procedures on file.  ED SEPSIS DOCUMENTATION   Time reflects when diagnosis was documented in both MDM as applicable and the Disposition within this note       Time User Action Codes Description Comment    12/7/2024  5:09 PM Toi Swan [J18.9] Pneumonia                  Toi Swan DO  12/08/24 1348

## 2024-12-13 LAB
BACTERIA BLD CULT: NORMAL
BACTERIA BLD CULT: NORMAL

## 2025-01-10 DIAGNOSIS — E11.9 CONTROLLED TYPE 2 DIABETES MELLITUS WITHOUT COMPLICATION, WITHOUT LONG-TERM CURRENT USE OF INSULIN (HCC): ICD-10-CM

## 2025-07-03 ENCOUNTER — OFFICE VISIT (OUTPATIENT)
Dept: FAMILY MEDICINE CLINIC | Facility: CLINIC | Age: 55
End: 2025-07-03
Payer: COMMERCIAL

## 2025-07-03 ENCOUNTER — APPOINTMENT (OUTPATIENT)
Dept: LAB | Facility: CLINIC | Age: 55
End: 2025-07-03
Payer: COMMERCIAL

## 2025-07-03 VITALS
BODY MASS INDEX: 31.5 KG/M2 | RESPIRATION RATE: 16 BRPM | DIASTOLIC BLOOD PRESSURE: 72 MMHG | HEIGHT: 66 IN | SYSTOLIC BLOOD PRESSURE: 126 MMHG | WEIGHT: 196 LBS | OXYGEN SATURATION: 98 % | HEART RATE: 76 BPM

## 2025-07-03 DIAGNOSIS — Z00.00 ANNUAL PHYSICAL EXAM: ICD-10-CM

## 2025-07-03 DIAGNOSIS — E11.9 CONTROLLED TYPE 2 DIABETES MELLITUS WITHOUT COMPLICATION, WITHOUT LONG-TERM CURRENT USE OF INSULIN (HCC): Primary | ICD-10-CM

## 2025-07-03 DIAGNOSIS — E78.2 MIXED HYPERLIPIDEMIA: ICD-10-CM

## 2025-07-03 DIAGNOSIS — E11.9 CONTROLLED TYPE 2 DIABETES MELLITUS WITHOUT COMPLICATION, WITHOUT LONG-TERM CURRENT USE OF INSULIN (HCC): ICD-10-CM

## 2025-07-03 PROBLEM — E78.5 HYPERLIPIDEMIA: Status: RESOLVED | Noted: 2020-03-12 | Resolved: 2025-07-03

## 2025-07-03 LAB
ALBUMIN SERPL BCG-MCNC: 4.2 G/DL (ref 3.5–5)
ALP SERPL-CCNC: 87 U/L (ref 34–104)
ALT SERPL W P-5'-P-CCNC: 23 U/L (ref 7–52)
ANION GAP SERPL CALCULATED.3IONS-SCNC: 9 MMOL/L (ref 4–13)
AST SERPL W P-5'-P-CCNC: 17 U/L (ref 13–39)
BILIRUB SERPL-MCNC: 0.53 MG/DL (ref 0.2–1)
BUN SERPL-MCNC: 12 MG/DL (ref 5–25)
CALCIUM SERPL-MCNC: 9.3 MG/DL (ref 8.4–10.2)
CHLORIDE SERPL-SCNC: 104 MMOL/L (ref 96–108)
CHOLEST SERPL-MCNC: 185 MG/DL (ref ?–200)
CO2 SERPL-SCNC: 24 MMOL/L (ref 21–32)
CREAT SERPL-MCNC: 0.77 MG/DL (ref 0.6–1.3)
CREAT UR-MCNC: 190.8 MG/DL
ERYTHROCYTE [DISTWIDTH] IN BLOOD BY AUTOMATED COUNT: 12.6 % (ref 11.6–15.1)
EST. AVERAGE GLUCOSE BLD GHB EST-MCNC: 183 MG/DL
GFR SERPL CREATININE-BSD FRML MDRD: 87 ML/MIN/1.73SQ M
GLUCOSE P FAST SERPL-MCNC: 198 MG/DL (ref 65–99)
HBA1C MFR BLD: 8 %
HCT VFR BLD AUTO: 38.9 % (ref 34.8–46.1)
HDLC SERPL-MCNC: 47 MG/DL
HGB BLD-MCNC: 12.6 G/DL (ref 11.5–15.4)
LDLC SERPL CALC-MCNC: 121 MG/DL (ref 0–100)
MCH RBC QN AUTO: 29.2 PG (ref 26.8–34.3)
MCHC RBC AUTO-ENTMCNC: 32.4 G/DL (ref 31.4–37.4)
MCV RBC AUTO: 90 FL (ref 82–98)
MICROALBUMIN UR-MCNC: 14 MG/L
MICROALBUMIN/CREAT 24H UR: 7 MG/G CREATININE (ref 0–30)
PLATELET # BLD AUTO: 281 THOUSANDS/UL (ref 149–390)
PMV BLD AUTO: 9.7 FL (ref 8.9–12.7)
POTASSIUM SERPL-SCNC: 4.2 MMOL/L (ref 3.5–5.3)
PROT SERPL-MCNC: 7.1 G/DL (ref 6.4–8.4)
RBC # BLD AUTO: 4.31 MILLION/UL (ref 3.81–5.12)
SODIUM SERPL-SCNC: 137 MMOL/L (ref 135–147)
TRIGL SERPL-MCNC: 83 MG/DL (ref ?–150)
TSH SERPL DL<=0.05 MIU/L-ACNC: 2.05 UIU/ML (ref 0.45–4.5)
WBC # BLD AUTO: 5.41 THOUSAND/UL (ref 4.31–10.16)

## 2025-07-03 PROCEDURE — 82043 UR ALBUMIN QUANTITATIVE: CPT

## 2025-07-03 PROCEDURE — 84443 ASSAY THYROID STIM HORMONE: CPT

## 2025-07-03 PROCEDURE — 82570 ASSAY OF URINE CREATININE: CPT

## 2025-07-03 PROCEDURE — 99396 PREV VISIT EST AGE 40-64: CPT | Performed by: FAMILY MEDICINE

## 2025-07-03 PROCEDURE — 85027 COMPLETE CBC AUTOMATED: CPT

## 2025-07-03 PROCEDURE — 80053 COMPREHEN METABOLIC PANEL: CPT

## 2025-07-03 PROCEDURE — 36415 COLL VENOUS BLD VENIPUNCTURE: CPT

## 2025-07-03 PROCEDURE — 83036 HEMOGLOBIN GLYCOSYLATED A1C: CPT

## 2025-07-03 PROCEDURE — 80061 LIPID PANEL: CPT

## 2025-07-03 PROCEDURE — 99214 OFFICE O/P EST MOD 30 MIN: CPT | Performed by: FAMILY MEDICINE

## 2025-07-03 RX ORDER — METFORMIN HYDROCHLORIDE 500 MG/1
1000 TABLET, EXTENDED RELEASE ORAL
Qty: 200 TABLET | Refills: 1 | Status: SHIPPED | OUTPATIENT
Start: 2025-07-03 | End: 2025-07-10

## 2025-07-03 NOTE — ASSESSMENT & PLAN NOTE
Lab Results   Component Value Date    HGBA1C 7.3 (H) 04/25/2024     Patient will have hemoglobin A1c completed today as well as urine for microalbumin.    - Patient does understand dietary recommendations for diabetes    - I am prescribing extended release formulation of metformin since she is only taking her medication once a day.  I did explain that immediate release medication should have been dosed twice daily    - Likely will reevaluate in 6 months unless significant abnormalities on labs

## 2025-07-03 NOTE — PROGRESS NOTES
"  Subjective:      Patient ID: Armida Morrow is a 55 y.o. female.    55-year-old female presents as new patient for me as prior physician left the network.  Due for annual physical examination and follow-up in regards to diabetes.  Patient has not had blood work done  diabetic testing since April 2024.  She states that she has been taking her immediately to use metformin 2 tablets once daily which should have been 1 tablet twice daily.  Did have elevated cholesterol as well.  Will be due for diabetic eye examination in August 2025.  No particular complaints or concerns.  She states that she does understand dietary recommendations for diabetes.  Current with screening mammogram and had a total hysterectomy        Past Medical History[1]    Family History[2]    Past Surgical History[3]     reports that she quit smoking about 17 years ago. She has never used smokeless tobacco. She reports current alcohol use of about 2.0 standard drinks of alcohol per week. She reports that she does not use drugs.    Current Medications[4]    The following portions of the patient's history were reviewed and updated as appropriate: allergies, current medications, past family history, past medical history, past social history, past surgical history and problem list.    Review of Systems   Constitutional: Negative.    HENT: Negative.     Eyes: Negative.    Respiratory: Negative.     Cardiovascular: Negative.    Gastrointestinal: Negative.    Endocrine: Negative.    Genitourinary: Negative.    Musculoskeletal: Negative.    Skin: Negative.    Allergic/Immunologic: Negative.    Neurological: Negative.    Hematological: Negative.    Psychiatric/Behavioral: Negative.             Objective:    /72   Pulse 76   Resp 16   Ht 5' 6\" (1.676 m)   Wt 88.9 kg (196 lb)   LMP 11/08/2017   SpO2 98%   BMI 31.64 kg/m²      Physical Exam  Vitals and nursing note reviewed.   Constitutional:       General: She is not in acute distress.     " Appearance: Normal appearance. She is well-developed. She is obese. She is not diaphoretic.   HENT:      Head: Normocephalic and atraumatic.      Right Ear: Tympanic membrane, ear canal and external ear normal.      Left Ear: Tympanic membrane, ear canal and external ear normal.      Nose: Nose normal.      Mouth/Throat:      Mouth: Mucous membranes are moist.      Pharynx: Oropharynx is clear.     Eyes:      Conjunctiva/sclera: Conjunctivae normal.      Pupils: Pupils are equal, round, and reactive to light.       Cardiovascular:      Rate and Rhythm: Normal rate and regular rhythm.      Pulses: no weak pulses.           Dorsalis pedis pulses are 2+ on the right side and 2+ on the left side.        Posterior tibial pulses are 2+ on the right side and 2+ on the left side.      Heart sounds: Normal heart sounds. No murmur heard.  Pulmonary:      Effort: Pulmonary effort is normal.      Breath sounds: Normal breath sounds.   Abdominal:      General: Bowel sounds are normal.      Palpations: Abdomen is soft.     Musculoskeletal:         General: Normal range of motion.      Cervical back: Normal range of motion and neck supple.   Feet:      Right foot:      Skin integrity: No ulcer, skin breakdown, erythema, warmth, callus or dry skin.      Left foot:      Skin integrity: No ulcer, skin breakdown, erythema, warmth, callus or dry skin.     Skin:     General: Skin is warm and dry.      Findings: No rash.     Neurological:      General: No focal deficit present.      Mental Status: She is alert and oriented to person, place, and time. Mental status is at baseline.      Deep Tendon Reflexes: Reflexes are normal and symmetric.     Psychiatric:         Mood and Affect: Mood normal.         Behavior: Behavior normal.         Thought Content: Thought content normal.         Judgment: Judgment normal.           No results found for this or any previous visit (from the past 6 weeks).    Assessment/Plan:    Controlled type 2  diabetes mellitus without complication, without long-term current use of insulin ([facility])    Lab Results   Component Value Date    HGBA1C 7.3 (H) 04/25/2024     Patient will have hemoglobin A1c completed today as well as urine for microalbumin.    - Patient does understand dietary recommendations for diabetes    - I am prescribing extended release formulation of metformin since she is only taking her medication once a day.  I did explain that immediate release medication should have been dosed twice daily    - Likely will reevaluate in 6 months unless significant abnormalities on labs    Annual physical exam  Annual physical examination performed    - Patient is current on mammography as well as colonoscopy    Mixed hyperlipidemia  Explained that her goal LDL should be less than 70 as a diabetic    Repeat lipid panel ordered        Problem List Items Addressed This Visit        Endocrine    Controlled type 2 diabetes mellitus without complication, without long-term current use of insulin ([facility]) - Primary      Lab Results   Component Value Date    HGBA1C 7.3 (H) 04/25/2024     Patient will have hemoglobin A1c completed today as well as urine for microalbumin.    - Patient does understand dietary recommendations for diabetes    - I am prescribing extended release formulation of metformin since she is only taking her medication once a day.  I did explain that immediate release medication should have been dosed twice daily    - Likely will reevaluate in 6 months unless significant abnormalities on labs         Relevant Medications    metFORMIN (GLUCOPHAGE-XR) 500 mg 24 hr tablet    Other Relevant Orders    Albumin / creatinine urine ratio    Comprehensive metabolic panel    Hemoglobin A1C    CBC and Platelet    Lipid Panel with Direct LDL reflex       Other    Annual physical exam    Annual physical examination performed    - Patient is current on mammography as well as colonoscopy         Relevant Orders    CBC and  Platelet    TSH, 3rd generation with Free T4 reflex    Mixed hyperlipidemia    Explained that her goal LDL should be less than 70 as a diabetic    Repeat lipid panel ordered              Patient's shoes and socks removed.    Right Foot/Ankle   Right Foot Inspection  Skin Exam: skin normal and skin intact. No dry skin, no warmth, no callus, no erythema, no maceration, no abnormal color, no pre-ulcer, no ulcer and no callus.     Toe Exam: ROM and strength within normal limits.     Sensory   Vibration: intact  Proprioception: intact  Monofilament testing: intact    Vascular  Capillary refills: < 3 seconds  The right DP pulse is 2+. The right PT pulse is 2+.     Left Foot/Ankle  Left Foot Inspection  Skin Exam: skin normal and skin intact. No dry skin, no warmth, no erythema, no maceration, normal color, no pre-ulcer, no ulcer and no callus.     Toe Exam: ROM and strength within normal limits.     Sensory   Vibration: intact  Proprioception: intact  Monofilament testing: intact    Vascular  Capillary refills: < 3 seconds  The left DP pulse is 2+. The left PT pulse is 2+.     Assign Risk Category  No deformity present  No loss of protective sensation  No weak pulses  Risk: 0              [1]  Past Medical History:  Diagnosis Date   • History of transfusion 2017    given due to Hgb at 6.6, no rx   •  (spontaneous vaginal delivery)     x4   [2]  Family History  Problem Relation Name Age of Onset   • No Known Problems Mother     • Lung cancer Father     • No Known Problems Sister     • No Known Problems Daughter     • No Known Problems Daughter     • No Known Problems Daughter     • No Known Problems Daughter     • No Known Problems Maternal Grandmother     • No Known Problems Maternal Grandfather     • No Known Problems Paternal Grandmother     • No Known Problems Paternal Grandfather     • No Known Problems Maternal Aunt     [3]  Past Surgical History:  Procedure Laterality Date   • HYSTERECTOMY     • [place]  CYSTOURETHROSCOPY N/A 01/08/2018    Procedure: CYSTOSCOPY;  Surgeon: Ray Adkins MD;  [facility];  Service: Gynecology   • IA LAPS TOTAL HYSTERECT 250 GM/< W/RMVL TUBE/OVARY N/A 01/08/2018    Procedure: TOTAL LAPAROSCOPIC HYSTERECTOMY; BILATERAL SALPINGECTOMY;  Surgeon: Ray Adkins MD;  [facility];  Service: Gynecology   • IA TOTAL ABDOMINAL HYSTERECT W/WO RMVL TUBE OVARY N/A 01/08/2018    Procedure: TOTAL ABDOMINAL HYSTERECTOMY;  Surgeon: Ray Adkins MD;  [facility];  Service: Gynecology   • SALPINGECTOMY Bilateral 01/08/2018    Procedure: SALPINGECTOMY;  Surgeon: Ray Adkins MD;  [facility];  Service: Gynecology   • WISDOM TOOTH EXTRACTION     [4]    Current Outpatient Medications:   •  metFORMIN (GLUCOPHAGE-XR) 500 mg 24 hr tablet, Take 2 tablets (1,000 mg total) by mouth daily with breakfast, Disp: 200 tablet, Rfl: 1

## 2025-07-03 NOTE — ASSESSMENT & PLAN NOTE
Annual physical examination performed    - Patient is current on mammography as well as colonoscopy

## 2025-07-07 ENCOUNTER — RESULTS FOLLOW-UP (OUTPATIENT)
Dept: FAMILY MEDICINE CLINIC | Facility: CLINIC | Age: 55
End: 2025-07-07

## 2025-07-07 NOTE — TELEPHONE ENCOUNTER
----- Message from Simon Schilling DO sent at 7/7/2025  7:57 AM EDT -----  Please call patient to schedule follow-up appointment to review lab results.  Hemoglobin A1c and cholesterol both need to be addressed  ----- Message -----  From: Lab, Background User  Sent: 7/3/2025   1:30 PM EDT  To: Simon Schilling DO

## 2025-07-10 ENCOUNTER — OFFICE VISIT (OUTPATIENT)
Dept: FAMILY MEDICINE CLINIC | Facility: CLINIC | Age: 55
End: 2025-07-10
Payer: COMMERCIAL

## 2025-07-10 VITALS
DIASTOLIC BLOOD PRESSURE: 76 MMHG | BODY MASS INDEX: 31.5 KG/M2 | WEIGHT: 196 LBS | OXYGEN SATURATION: 98 % | HEART RATE: 74 BPM | SYSTOLIC BLOOD PRESSURE: 128 MMHG | HEIGHT: 66 IN | RESPIRATION RATE: 16 BRPM

## 2025-07-10 DIAGNOSIS — E78.2 MIXED HYPERLIPIDEMIA: Primary | ICD-10-CM

## 2025-07-10 DIAGNOSIS — E11.9 CONTROLLED TYPE 2 DIABETES MELLITUS WITHOUT COMPLICATION, WITHOUT LONG-TERM CURRENT USE OF INSULIN (HCC): ICD-10-CM

## 2025-07-10 PROCEDURE — 99214 OFFICE O/P EST MOD 30 MIN: CPT | Performed by: FAMILY MEDICINE

## 2025-07-10 RX ORDER — METFORMIN HYDROCHLORIDE 750 MG/1
1500 TABLET, EXTENDED RELEASE ORAL
Qty: 200 TABLET | Refills: 3 | Status: SHIPPED | OUTPATIENT
Start: 2025-07-10

## 2025-07-10 NOTE — ASSESSMENT & PLAN NOTE
Lab Results   Component Value Date    HGBA1C 8.0 (H) 07/03/2025     - Explain the mechanism of action of multiple diabetic agents.  Patient is reluctant about going on to an additional medication as she has not experienced side effects with metformin.  Will try to start maximizing her dose of metformin.  Increase to 1500 mg and extended release formulation once daily    - Patient will be sent for diabetic education classes so that she understands her condition, lifestyle changes that she should be doing as well    Repeat diabetic parameters will be done in 3 months

## 2025-07-10 NOTE — PROGRESS NOTES
"  Subjective:      Patient ID: Armida Morrow is a 55 y.o. female.    55-year-old female presents at my request to review labs.  Hemoglobin A1c at 8.0% and elevated LDL cholesterol with lower HDL cholesterol.  Patient has started taking extended release metformin 1000 mg once daily but previously was taking 1000 mg of immediate release metformin once daily.  No side effects from metformin.  Prior PCP had discussed other options for diabetic treatment as well as treatment for her cholesterol.  I did explain that every diabetic should be on statin therapy.  She is reluctant about going on statin therapy.  She was never sent for diabetic education.  She was not aware of what things she should and should not be eating for diabetes as well as high cholesterol        Past Medical History[1]    Family History[2]    Past Surgical History[3]     reports that she quit smoking about 17 years ago. She has never used smokeless tobacco. She reports current alcohol use of about 2.0 standard drinks of alcohol per week. She reports that she does not use drugs.    Current Medications[4]    The following portions of the patient's history were reviewed and updated as appropriate: allergies, current medications, past family history, past medical history, past social history, past surgical history and problem list.    Review of Systems   Constitutional: Negative.    HENT: Negative.     Eyes: Negative.    Respiratory: Negative.     Cardiovascular: Negative.    Gastrointestinal: Negative.    Endocrine: Negative.    Genitourinary: Negative.    Musculoskeletal: Negative.    Skin: Negative.    Allergic/Immunologic: Negative.    Neurological: Negative.    Hematological: Negative.    Psychiatric/Behavioral: Negative.             Objective:    /76   Pulse 74   Resp 16   Ht 5' 6\" (1.676 m)   Wt 88.9 kg (196 lb)   LMP 11/08/2017   SpO2 98%   BMI 31.64 kg/m²      Physical Exam  Vitals and nursing note reviewed.   Constitutional:       " Appearance: Normal appearance. She is obese.     Cardiovascular:      Rate and Rhythm: Normal rate and regular rhythm.     Neurological:      General: No focal deficit present.      Mental Status: She is alert and oriented to person, place, and time.           Recent Results (from the past 6 weeks)   Albumin / creatinine urine ratio    Collection Time: 07/03/25  9:21 AM   Result Value Ref Range    Creatinine, Ur 190.8 Reference range not established. mg/dL    Albumin,U,Random 14.0 <20.0 mg/L    Albumin Creat Ratio 7 0 - 30 mg/g creatinine   Comprehensive metabolic panel    Collection Time: 07/03/25  9:21 AM   Result Value Ref Range    Sodium 137 135 - 147 mmol/L    Potassium 4.2 3.5 - 5.3 mmol/L    Chloride 104 96 - 108 mmol/L    CO2 24 21 - 32 mmol/L    ANION GAP 9 4 - 13 mmol/L    BUN 12 5 - 25 mg/dL    Creatinine 0.77 0.60 - 1.30 mg/dL    Glucose, Fasting 198 (H) 65 - 99 mg/dL    Calcium 9.3 8.4 - 10.2 mg/dL    AST 17 13 - 39 U/L    ALT 23 7 - 52 U/L    Alkaline Phosphatase 87 34 - 104 U/L    Total Protein 7.1 6.4 - 8.4 g/dL    Albumin 4.2 3.5 - 5.0 g/dL    Total Bilirubin 0.53 0.20 - 1.00 mg/dL    eGFR 87 ml/min/1.73sq m   Hemoglobin A1C    Collection Time: 07/03/25  9:21 AM   Result Value Ref Range    Hemoglobin A1C 8.0 (H) Normal 4.0-5.6%; PreDiabetic 5.7-6.4%; Diabetic >=6.5%; Glycemic control for adults with diabetes <7.0% %     mg/dl   CBC and Platelet    Collection Time: 07/03/25  9:21 AM   Result Value Ref Range    WBC 5.41 4.31 - 10.16 Thousand/uL    RBC 4.31 3.81 - 5.12 Million/uL    Hemoglobin 12.6 11.5 - 15.4 g/dL    Hematocrit 38.9 34.8 - 46.1 %    MCV 90 82 - 98 fL    MCH 29.2 26.8 - 34.3 pg    MCHC 32.4 31.4 - 37.4 g/dL    RDW 12.6 11.6 - 15.1 %    Platelets 281 149 - 390 Thousands/uL    MPV 9.7 8.9 - 12.7 fL   Lipid Panel with Direct LDL reflex    Collection Time: 07/03/25  9:21 AM   Result Value Ref Range    Cholesterol 185 See Comment mg/dL    Triglycerides 83 See Comment mg/dL    HDL,  Direct 47 (L) >=50 mg/dL    LDL Calculated 121 (H) 0 - 100 mg/dL   TSH, 3rd generation with Free T4 reflex    Collection Time: 07/03/25  9:21 AM   Result Value Ref Range    TSH 3RD GENERATION 2.053 0.450 - 4.500 uIU/mL       Assessment/Plan:    Controlled type 2 diabetes mellitus without complication, without long-term current use of insulin ([facility])    Lab Results   Component Value Date    HGBA1C 8.0 (H) 07/03/2025     - Explain the mechanism of action of multiple diabetic agents.  Patient is reluctant about going on to an additional medication as she has not experienced side effects with metformin.  Will try to start maximizing her dose of metformin.  Increase to 1500 mg and extended release formulation once daily    - Patient will be sent for diabetic education classes so that she understands her condition, lifestyle changes that she should be doing as well    Repeat diabetic parameters will be done in 3 months    Mixed hyperlipidemia  Once again explained that goal LDL less than 70 for diabetic    - Patient's cholesterol is not at goal.  Offered statin.  Declined statin.  She wishes to make dietary changes          Problem List Items Addressed This Visit        Endocrine    Controlled type 2 diabetes mellitus without complication, without long-term current use of insulin ([facility])      Lab Results   Component Value Date    HGBA1C 8.0 (H) 07/03/2025     - Explain the mechanism of action of multiple diabetic agents.  Patient is reluctant about going on to an additional medication as she has not experienced side effects with metformin.  Will try to start maximizing her dose of metformin.  Increase to 1500 mg and extended release formulation once daily    - Patient will be sent for diabetic education classes so that she understands her condition, lifestyle changes that she should be doing as well    Repeat diabetic parameters will be done in 3 months         Relevant Medications    metFORMIN (GLUCOPHAGE-XR) 750 mg 24 hr  tablet    Other Relevant Orders    Ambulatory referral to Diabetic Education - use to refer for diabetes group classes, individual diabetes education, medical nutrition therapy, device training    Albumin / creatinine urine ratio    Comprehensive metabolic panel    Hemoglobin A1C    Lipid panel    TSH, 3rd generation with Free T4 reflex       Other    Mixed hyperlipidemia - Primary    Once again explained that goal LDL less than 70 for diabetic    - Patient's cholesterol is not at goal.  Offered statin.  Declined statin.  She wishes to make dietary changes         Relevant Orders    Comprehensive metabolic panel    Lipid panel              [1]  Past Medical History:  Diagnosis Date   • History of transfusion 2017    given due to Hgb at 6.6, no rx   •  (spontaneous vaginal delivery)     x4   [2]  Family History  Problem Relation Name Age of Onset   • No Known Problems Mother     • Lung cancer Father     • No Known Problems Sister     • No Known Problems Daughter     • No Known Problems Daughter     • No Known Problems Daughter     • No Known Problems Daughter     • No Known Problems Maternal Grandmother     • No Known Problems Maternal Grandfather     • No Known Problems Paternal Grandmother     • No Known Problems Paternal Grandfather     • No Known Problems Maternal Aunt     [3]  Past Surgical History:  Procedure Laterality Date   • HYSTERECTOMY     • NH CYSTOURETHROSCOPY N/A 2018    Procedure: CYSTOSCOPY;  Surgeon: Ray Adkins MD;  [facility];  Service: Gynecology   • NH LAPS TOTAL HYSTERECT 250 GM/< W/RMVL TUBE/OVARY N/A 2018    Procedure: TOTAL LAPAROSCOPIC HYSTERECTOMY; BILATERAL SALPINGECTOMY;  Surgeon: Ray Adkins MD;  [facility];  Service: Gynecology   • NH TOTAL ABDOMINAL HYSTERECT W/WO RMVL TUBE OVARY N/A 2018    Procedure: TOTAL ABDOMINAL HYSTERECTOMY;  Surgeon: Ray Adkins MD;  [facility];  Service: Gynecology   •  SALPINGECTOMY Bilateral 01/08/2018    Procedure: SALPINGECTOMY;  Surgeon: Ray Adkins MD;  [facility];  Service: Gynecology   • WISDOM TOOTH EXTRACTION     [4]    Current Outpatient Medications:   •  metFORMIN (GLUCOPHAGE-XR) 750 mg 24 hr tablet, Take 2 tablets (1,500 mg total) by mouth daily with breakfast, Disp: 200 tablet, Rfl: 3

## 2025-07-10 NOTE — ASSESSMENT & PLAN NOTE
Once again explained that goal LDL less than 70 for diabetic    - Patient's cholesterol is not at goal.  Offered statin.  Declined statin.  She wishes to make dietary changes

## (undated) DEVICE — PLUMEPEN PRO 10FT

## (undated) DEVICE — CYSTO TUBING SINGLE IRRIGATION

## (undated) DEVICE — GLOVE INDICATOR PI UNDERGLOVE SZ 7 BLUE

## (undated) DEVICE — ARTHROSCOPY FLOOR MAT

## (undated) DEVICE — SPONGE LAP 18 X 18 IN

## (undated) DEVICE — OCCLUDER COLPO-PNEUMO

## (undated) DEVICE — LIGHT HANDLE COVER SLEEVE DISP BLUE STELLAR

## (undated) DEVICE — CHLORAPREP HI-LITE 26ML ORANGE

## (undated) DEVICE — STRL UNIVERSAL LAPAROTOMY PACK: Brand: CARDINAL HEALTH

## (undated) DEVICE — TUBING SUCTION 5MM X 12 FT

## (undated) DEVICE — ELECTRODE LAP J HOOK SPLIT STEM E-Z CLEAN 33CM -0021S

## (undated) DEVICE — SYRINGE 50ML LL

## (undated) DEVICE — INTENDED FOR TISSUE SEPARATION, AND OTHER PROCEDURES THAT REQUIRE A SHARP SURGICAL BLADE TO PUNCTURE OR CUT.: Brand: BARD-PARKER SAFETY BLADES SIZE 10, STERILE

## (undated) DEVICE — ENSEAL LAPAROSCOPIC TISSUE SEALER G2 ARTICULATING  CURVED JAW FOR USE WITH G2 GENERATOR 5MM DIAMETER 35CM SHAFT LENGTH: Brand: ENSEAL

## (undated) DEVICE — 3M™ IOBAN™ 2 ANTIMICROBIAL INCISE DRAPE 6650EZ: Brand: IOBAN™ 2

## (undated) DEVICE — SCD SEQUENTIAL COMPRESSION COMFORT SLEEVE MEDIUM KNEE LENGTH: Brand: KENDALL SCD

## (undated) DEVICE — BULB SYRINGE,IRRIGATION WITH PROTECTIVE CAP: Brand: DOVER

## (undated) DEVICE — STERILE SURGICAL LUBRICANT,  TUBE: Brand: SURGILUBE

## (undated) DEVICE — ADHESIVE SKN CLSR HISTOACRYL FLEX 0.5ML LF

## (undated) DEVICE — UNIVERSAL GYN LAPAROSCOPY,KIT: Brand: CARDINAL HEALTH

## (undated) DEVICE — SUT CHROMIC 3-0 SH 27 IN G122H

## (undated) DEVICE — SUT VICRYL 2-0 CT-2 18 IN J726D

## (undated) DEVICE — X-RAY DETECTABLE SPONGES,16 PLY: Brand: VISTEC

## (undated) DEVICE — PREMIUM DRY TRAY LF: Brand: MEDLINE INDUSTRIES, INC.

## (undated) DEVICE — IRRIG ENDO FLO TUBING

## (undated) DEVICE — WOUND RETRACTOR AND PROTECTOR: Brand: ALEXIS O WOUND PROTECTOR-RETRACTOR

## (undated) DEVICE — UTERINE MANIPULATOR RUMI 6.7 X 10 CM

## (undated) DEVICE — ENDOPATH XCEL UNIVERSAL TROCAR STABLILITY SLEEVES: Brand: ENDOPATH XCEL

## (undated) DEVICE — ABDOMINAL PAD: Brand: DERMACEA

## (undated) DEVICE — INTENDED FOR TISSUE SEPARATION, AND OTHER PROCEDURES THAT REQUIRE A SHARP SURGICAL BLADE TO PUNCTURE OR CUT.: Brand: BARD-PARKER SAFETY BLADES SIZE 11, STERILE

## (undated) DEVICE — NEEDLE 25G X 1 1/2

## (undated) DEVICE — 1820 FOAM BLOCK NEEDLE COUNTER: Brand: DEVON

## (undated) DEVICE — MAYO STAND COVER: Brand: CONVERTORS

## (undated) DEVICE — TOWEL SET X-RAY

## (undated) DEVICE — ENSEAL 20 CM SHAFT, LARGE JAW: Brand: ENSEAL X1

## (undated) DEVICE — DRAPE LAPAROTOMY W/POUCHES

## (undated) DEVICE — ASTOUND STANDARD SURGICAL GOWN, XL: Brand: CONVERTORS

## (undated) DEVICE — SUT MONOCRYL 4-0 PS-2 18 IN Y496G

## (undated) DEVICE — TRAY FOLEY 16FR URIMETER SURESTEP

## (undated) DEVICE — GAUZE SPONGES,16 PLY: Brand: CURITY

## (undated) DEVICE — GLOVE SRG BIOGEL 6.5

## (undated) DEVICE — MEDI-VAC YANK SUCT HNDL W/TPRD BULBOUS TIP: Brand: CARDINAL HEALTH

## (undated) DEVICE — GLOVE INDICATOR PI UNDERGLOVE SZ 8 BLUE

## (undated) DEVICE — SUT VICRYL 0 CT-1 27 IN J260H

## (undated) DEVICE — ENDOPATH XCEL BLADELESS TROCARS WITH STABILITY SLEEVES: Brand: ENDOPATH XCEL

## (undated) DEVICE — SUT VICRYL 3-0 SH 27 IN J416H

## (undated) DEVICE — CHLORHEXIDINE 4PCT 4 OZ

## (undated) DEVICE — 3000CC GUARDIAN II: Brand: GUARDIAN